# Patient Record
Sex: FEMALE | Race: WHITE | Employment: PART TIME | URBAN - METROPOLITAN AREA
[De-identification: names, ages, dates, MRNs, and addresses within clinical notes are randomized per-mention and may not be internally consistent; named-entity substitution may affect disease eponyms.]

---
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
## 2021-12-03 ENCOUNTER — HOSPITAL ENCOUNTER (EMERGENCY)
Age: 21
Discharge: HOME | End: 2021-12-04
Attending: EMERGENCY MEDICINE
Payer: COMMERCIAL

## 2021-12-03 VITALS
WEIGHT: 203.7 LBS | RESPIRATION RATE: 16 BRPM | TEMPERATURE: 97.9 F | HEIGHT: 61 IN | OXYGEN SATURATION: 100 % | SYSTOLIC BLOOD PRESSURE: 105 MMHG | BODY MASS INDEX: 38.46 KG/M2 | HEART RATE: 93 BPM | DIASTOLIC BLOOD PRESSURE: 60 MMHG

## 2021-12-03 DIAGNOSIS — Z20.822 CLOSE EXPOSURE TO COVID-19 VIRUS: Primary | ICD-10-CM

## 2021-12-03 PROCEDURE — U0003 INFECTIOUS AGENT DETECTION BY NUCLEIC ACID (DNA OR RNA); SEVERE ACUTE RESPIRATORY SYNDROME CORONAVIRUS 2 (SARS-COV-2) (CORONAVIRUS DISEASE [COVID-19]), AMPLIFIED PROBE TECHNIQUE, MAKING USE OF HIGH THROUGHPUT TECHNOLOGIES AS DESCRIBED BY CMS-2020-01-R: HCPCS

## 2021-12-03 PROCEDURE — U0005 INFEC AGEN DETEC AMPLI PROBE: HCPCS

## 2021-12-03 PROCEDURE — 99282 EMERGENCY DEPT VISIT SF MDM: CPT

## 2021-12-04 NOTE — ED PROVIDER NOTES
[facility] Emergency Department      Pt Name: Debi Valle  MRN: [id number]  [place] 2000  Date of evaluation: 12/3/2021  Provider: Kecia Llanos MD  CHIEF COMPLAINT  Chief Complaint   Patient presents with   Celamandavalarie Ray is a 24 y.o. female who presents because she was exposed to Covid with a close family member just being diagnosed with it. The patient is currently 29 weeks pregnant. She is asymptomatic. She wanted to have the test performed. She has already recovered from Covid infection once. She denies any chest pain or shortness of breath. Denies any abdominal pain. She has had good fetal movement. Denies any dysuria or frequency. REVIEW OF SYSTEMS:  No fever, no chest pain, no sob, no dysuria See HPI for further details. Remainder of pertinent ROS reviewed and negative. Nursing notes reviewed. PAST MEDICAL HISTORY  History reviewed. No pertinent past medical history. SURGICAL HISTORY  Past Surgical History:   Procedure Laterality Date    CHOLECYSTECTOMY       MEDICATIONS:  No current facility-administered medications on file prior to encounter. No current outpatient medications on file prior to encounter. ALLERGIES  Penicillins  SOCIAL HISTORY:  Social History     Tobacco Use    Smoking status: Never Smoker    Smokeless tobacco: Not on file   Vaping Use    Vaping Use: Never used   Substance Use Topics    Alcohol use: No    Drug use: No     IMMUNIZATIONS:    There is no immunization history on file for this patient. PHYSICAL EXAM  VITAL SIGNS:  Blood pressure 105/60, pulse 93, temperature 97.9 °F (36.6 °C), temperature source Oral, resp. rate 16, height 5' 1\" (1.549 m), weight 203 lb 11.2 oz (92.4 kg), last menstrual period 06/04/2021, SpO2 100 %.   Constitutional:  24 y.o. female who does not appear toxic or acutely ill  HENT:  Atraumatic, mucous membranes moist  Eyes:   Conjunctiva clear, no icterus  Neck:  Supple, no signs of injury, good

## 2021-12-04 NOTE — ED NOTES
Patient given d/c instructions with return verbalization . Will be called if Covid +. To check my chart for negative results if does not receive a call. Pt to calll OB/GYN with information. , To return with any worsening s/s. Patient ambulated to lobby with family.      Juan Jose Toney RN  12/04/21 [id number]

## 2021-12-05 LAB — SARS-COV-2: NOT DETECTED

## 2022-11-17 ENCOUNTER — OFFICE VISIT (OUTPATIENT)
Dept: ENT CLINIC | Age: 22
End: 2022-11-17
Payer: COMMERCIAL

## 2022-11-17 VITALS
HEIGHT: 61 IN | BODY MASS INDEX: 37.57 KG/M2 | HEART RATE: 80 BPM | SYSTOLIC BLOOD PRESSURE: 118 MMHG | WEIGHT: 199 LBS | DIASTOLIC BLOOD PRESSURE: 86 MMHG

## 2022-11-17 DIAGNOSIS — H92.03 OTALGIA OF BOTH EARS: Primary | ICD-10-CM

## 2022-11-17 DIAGNOSIS — H69.83 DYSFUNCTION OF BOTH EUSTACHIAN TUBES: ICD-10-CM

## 2022-11-17 DIAGNOSIS — M26.623 BILATERAL TEMPOROMANDIBULAR JOINT PAIN: ICD-10-CM

## 2022-11-17 PROCEDURE — G8417 CALC BMI ABV UP PARAM F/U: HCPCS | Performed by: OTOLARYNGOLOGY

## 2022-11-17 PROCEDURE — 99203 OFFICE O/P NEW LOW 30 MIN: CPT | Performed by: OTOLARYNGOLOGY

## 2022-11-17 PROCEDURE — 4004F PT TOBACCO SCREEN RCVD TLK: CPT | Performed by: OTOLARYNGOLOGY

## 2022-11-17 PROCEDURE — G8484 FLU IMMUNIZE NO ADMIN: HCPCS | Performed by: OTOLARYNGOLOGY

## 2022-11-17 PROCEDURE — G8427 DOCREV CUR MEDS BY ELIG CLIN: HCPCS | Performed by: OTOLARYNGOLOGY

## 2022-11-17 NOTE — PROGRESS NOTES
[facility]      Patient Name: [address] [place] [address] Record Number:  [id number]  Primary Care Physician:  Irma Sexton, BETITO - CNP  Date of Consultation: 11/17/2022    Chief Complaint: Ear problems        HISTORY OF 1923 [address] Junior Stauffer is a(n) 25 y.o. female who presents for ear issues. The patient says that she often times has a vague discomfort in her ears. It feels like a popping sensation. They oftentimes feel full. Sometimes it may affect her hearing. She has had this for a long time. She is never had ear surgery or ear tubes. She is tried certain medications and they have not helped. She does admit to grinding her teeth. She does not chew a lot of gum. She is working with a dentist on her teeth right now. REVIEW OF SYSTEMS  As above    PHYSICAL EXAM  GENERAL: No Acute Distress, Alert and Oriented, no Hoarseness, strong voice  EYES: EOMI, Anti-icteric  HENT:   Head: Normocephalic and atraumatic. Face:  Symmetric, facial nerve intact, no sinus tenderness  Right Ear: Normal external ear, normal external auditory canal, intact tympanic membrane with normal mobility and aerated middle ear  Left Ear: Normal external ear, normal external auditory canal, intact tympanic membrane with normal mobility and aerated middle ear  Mouth/Oral Cavity: Patient has mild discomfort in the bilateral TMJ region when opening closing mouth  Oropharynx/Larynx:  normal oropharynx  Nose:Normal external nasal appearance. NECK: Normal range of motion, no thyromegaly, trachea is midline, no lymphadenopathy, no neck masses, no crepitus            ASSESSMENT/PLAN  1. Otalgia of both ears  Not quite elizabeth otalgia, just a odd sensation. I think she has TMJ issues more than anything.   Is difficult to completely rule out eustachian tube dysfunction, but she does have a completely normal ear exam.  We are going to get a hearing test just because sometimes she feels as though she may have muffled hearing. 2. Bilateral temporomandibular joint pain  I told her to discuss a bite block with her dentist as she says that she thinks she grinds her teeth at night. 3. Dysfunction of both eustachian tubes  Start Flonase, obtain audiogram.  I am not sure that she even has actual eustachian tube dysfunction, but some of her description sounds like this. Her ear exam is pretty much normal             I have performed a head and neck physical exam personally or was physically present during the key or critical portions of the service. This note was generated completely or in part utilizing Dragon dictation speech recognition software. Occasionally, words are mistranscribed and despite editing, the text may contain inaccuracies due to incorrect word recognition. If further clarification is needed please contact the office at [phone].

## 2022-11-23 DIAGNOSIS — H91.90 HEARING LOSS, UNSPECIFIED HEARING LOSS TYPE, UNSPECIFIED LATERALITY: Primary | ICD-10-CM

## 2023-09-28 ENCOUNTER — HOSPITAL ENCOUNTER (EMERGENCY)
Age: 23
Discharge: HOME | End: 2023-09-28
Attending: EMERGENCY MEDICINE
Payer: COMMERCIAL

## 2023-09-28 VITALS
SYSTOLIC BLOOD PRESSURE: 123 MMHG | DIASTOLIC BLOOD PRESSURE: 77 MMHG | RESPIRATION RATE: 12 BRPM | HEART RATE: 68 BPM | TEMPERATURE: 98.3 F | OXYGEN SATURATION: 100 % | WEIGHT: 216.13 LBS | BODY MASS INDEX: 40.84 KG/M2

## 2023-09-28 DIAGNOSIS — J02.9 VIRAL PHARYNGITIS: Primary | ICD-10-CM

## 2023-09-28 LAB — HETEROPH AB BLD QL IA: NEGATIVE

## 2023-09-28 PROCEDURE — 86308 HETEROPHILE ANTIBODY SCREEN: CPT

## 2023-09-28 PROCEDURE — 99283 EMERGENCY DEPT VISIT LOW MDM: CPT

## 2023-09-28 NOTE — ED PROVIDER NOTES
[address] ENCOUNTER      Pt Name: Antwan Wright  MRN: [id number]  [address] [facility] 2000  Date of evaluation: 9/28/2023  Provider: Halley Shelton MD    [address]       Chief Complaint   Patient presents with    Pharyngitis     X 5 days seen at Urgent care, did strep test -, placed on atb's taken for 5 days , not getting better         HISTORY OF PRESENT ILLNESS   (Location/Symptom, Timing/Onset, Context/Setting, Quality, Duration, Modifying Factors, Severity)  Note limiting factors. Antwan Wright is a 21 y.o. female with past medical history of epic illness here for sore throat. Patient states that for almost 2 weeks she has had a itchy scratchy sometimes sore and painful throat. She is unsure of any overt fevers but is warm at times. No runny nose, nasal congestion. No cough. No vomiting or diarrhea. Denies cough. She has children at home but similar symptoms as of late. She notes 5 days ago she was in urgent care and had a strep swab performed that was negative, but was still placed on antibiotics which has been taking. She states she still has a sore throat and has come for reevaluation. No trouble swallowing. No change in her speech or voice    HPI    Nursing Notes were reviewed. REVIEW OF SYSTEMS    (2-9 systems for level 4, 10 or more for level 5)     Review of Systems    Please see HPI for pertinent positive and negative review of system findings. A full 10 system ROS was performed and otherwise negative. PAST MEDICAL HISTORY   History reviewed. No pertinent past medical history. SURGICAL HISTORY       Past Surgical History:   Procedure Laterality Date    CHOLECYSTECTOMY           CURRENT MEDICATIONS       Previous Medications    No medications on file       ALLERGIES     Penicillins    FAMILY HISTORY     History reviewed. No pertinent family history.        SOCIAL HISTORY       Social History     Socioeconomic History    Marital

## 2023-09-28 NOTE — ED NOTES
Pt dc/d with instructions in stable condition, ambulatory to lobby. Home per ride.      Reji Lux RN  09/28/23 0872

## 2023-10-12 ENCOUNTER — OFFICE VISIT (OUTPATIENT)
Dept: ENT CLINIC | Age: 23
End: 2023-10-12
Payer: COMMERCIAL

## 2023-10-12 VITALS
SYSTOLIC BLOOD PRESSURE: 132 MMHG | HEART RATE: 76 BPM | HEIGHT: 61 IN | BODY MASS INDEX: 41.54 KG/M2 | DIASTOLIC BLOOD PRESSURE: 83 MMHG | WEIGHT: 220 LBS | TEMPERATURE: 99 F | RESPIRATION RATE: 16 BRPM | OXYGEN SATURATION: 98 %

## 2023-10-12 DIAGNOSIS — J02.9 PHARYNGITIS, UNSPECIFIED ETIOLOGY: Primary | ICD-10-CM

## 2023-10-12 PROCEDURE — G8484 FLU IMMUNIZE NO ADMIN: HCPCS | Performed by: OTOLARYNGOLOGY

## 2023-10-12 PROCEDURE — 1036F TOBACCO NON-USER: CPT | Performed by: OTOLARYNGOLOGY

## 2023-10-12 PROCEDURE — G8417 CALC BMI ABV UP PARAM F/U: HCPCS | Performed by: OTOLARYNGOLOGY

## 2023-10-12 PROCEDURE — G8427 DOCREV CUR MEDS BY ELIG CLIN: HCPCS | Performed by: OTOLARYNGOLOGY

## 2023-10-12 PROCEDURE — 99213 OFFICE O/P EST LOW 20 MIN: CPT | Performed by: OTOLARYNGOLOGY

## 2023-10-12 RX ORDER — PREDNISONE 20 MG/1
20 TABLET ORAL DAILY
Qty: 5 TABLET | Refills: 0 | Status: SHIPPED | OUTPATIENT
Start: 2023-10-12 | End: 2023-10-17

## 2023-10-12 NOTE — PROGRESS NOTES
[address] [facility] NECK SURGERY  Follow up      Patient Name: Arlei [facility] Record Number:  [id number]  Primary Care Physician:  BETITO Almonte CNP  Date of Consultation: 10/12/2023    Chief Complaint: Sore throat        Interval History    Patient is presenting today for evaluation of a sore throat. She says she had a sore throat for about the past month. She has been seen by her her family doctors as well as the ER. She has had antibiotics. He said that the sore throat persists. She was tested for mono. It was negative. She is unaware of ever having mono in the past.  She does not get recurrent tonsillitis. REVIEW OF SYSTEMS    As above  PHYSICAL EXAM  GENERAL: No Acute Distress, Alert and Oriented, no Hoarseness, strong voice  EYES: EOMI, Anti-icteric  HENT:   Head: Normocephalic and atraumatic. Face:  Symmetric, facial nerve intact, no sinus tenderness  Right Ear: Normal external ear, normal external auditory canal, intact tympanic membrane with normal mobility and aerated middle ear  Left Ear: Normal external ear, normal external auditory canal, intact tympanic membrane with normal mobility and aerated middle ear  Mouth/Oral Cavity:  normal lips, Uvula is midline, no mucosal lesions  Oropharynx/Larynx: Mild to moderately inflamed tonsils without evidence of an acute infection  Nose:Normal external nasal appearance. NECK: Normal range of motion, no thyromegaly, trachea is midline, no lymphadenopathy, no neck masses, no crepitus            ASSESSMENT/PLAN  1. Pharyngitis, unspecified etiology  She does have some inflamed tonsils. She does not appear to have an acute infection. I suspect this is probably a viral etiology. it has only been a month so I would not recommend a tonsillectomy until she has symptoms for at least 3 months. I would give her a short course of prednisone to see if it helps. Follow-up in 6 to 8 weeks.              I have

## 2023-12-28 ENCOUNTER — APPOINTMENT (OUTPATIENT)
Dept: CT IMAGING | Age: 23
End: 2023-12-28
Payer: COMMERCIAL

## 2023-12-28 ENCOUNTER — HOSPITAL ENCOUNTER (EMERGENCY)
Age: 23
Discharge: HOME | End: 2023-12-28
Attending: STUDENT IN AN ORGANIZED HEALTH CARE EDUCATION/TRAINING PROGRAM
Payer: COMMERCIAL

## 2023-12-28 VITALS
RESPIRATION RATE: 18 BRPM | SYSTOLIC BLOOD PRESSURE: 127 MMHG | TEMPERATURE: 98.6 F | BODY MASS INDEX: 39.71 KG/M2 | HEART RATE: 91 BPM | OXYGEN SATURATION: 99 % | HEIGHT: 62 IN | WEIGHT: 215.8 LBS | DIASTOLIC BLOOD PRESSURE: 69 MMHG

## 2023-12-28 DIAGNOSIS — N30.01 ACUTE CYSTITIS WITH HEMATURIA: ICD-10-CM

## 2023-12-28 DIAGNOSIS — R11.2 NAUSEA AND VOMITING, UNSPECIFIED VOMITING TYPE: Primary | ICD-10-CM

## 2023-12-28 LAB
ALBUMIN SERPL-MCNC: 4.8 G/DL (ref 3.4–5)
ALBUMIN/GLOB SERPL: 1.5 {RATIO} (ref 1.1–2.2)
ALP SERPL-CCNC: 79 U/L (ref 40–129)
ALT SERPL-CCNC: 25 U/L (ref 10–40)
AMORPH SED URNS QL MICRO: ABNORMAL /HPF
ANION GAP SERPL CALCULATED.3IONS-SCNC: 12 MMOL/L (ref 3–16)
AST SERPL-CCNC: 22 U/L (ref 15–37)
BACTERIA URNS QL MICRO: ABNORMAL /HPF
BASOPHILS # BLD: 0.2 K/UL (ref 0–0.2)
BASOPHILS NFR BLD: 2 %
BILIRUB SERPL-MCNC: 0.8 MG/DL (ref 0–1)
BILIRUB UR QL STRIP.AUTO: ABNORMAL
BUN SERPL-MCNC: 9 MG/DL (ref 7–20)
CALCIUM SERPL-MCNC: 9.5 MG/DL (ref 8.3–10.6)
CHLORIDE SERPL-SCNC: 106 MMOL/L (ref 99–110)
CLARITY UR: ABNORMAL
CO2 SERPL-SCNC: 21 MMOL/L (ref 21–32)
COLOR UR: YELLOW
CREAT SERPL-MCNC: <0.5 MG/DL (ref 0.6–1.1)
DEPRECATED RDW RBC AUTO: 13.3 % (ref 12.4–15.4)
EOSINOPHIL # BLD: 0.1 K/UL (ref 0–0.6)
EOSINOPHIL NFR BLD: 0.5 %
EPI CELLS #/AREA URNS HPF: ABNORMAL /HPF (ref 0–5)
GFR SERPLBLD CREATININE-BSD FMLA CKD-EPI: >60 ML/MIN/{1.73_M2}
GLUCOSE SERPL-MCNC: 92 MG/DL (ref 70–99)
GLUCOSE UR STRIP.AUTO-MCNC: NEGATIVE MG/DL
HCG UR QL: NEGATIVE
HCT VFR BLD AUTO: 42.2 % (ref 36–48)
HGB BLD-MCNC: 14.4 G/DL (ref 12–16)
HGB UR QL STRIP.AUTO: ABNORMAL
KETONES UR STRIP.AUTO-MCNC: ABNORMAL MG/DL
LEUKOCYTE ESTERASE UR QL STRIP.AUTO: NEGATIVE
LIPASE SERPL-CCNC: 17 U/L (ref 13–60)
LYMPHOCYTES # BLD: 0.7 K/UL (ref 1–5.1)
LYMPHOCYTES NFR BLD: 7 %
MAGNESIUM SERPL-MCNC: 2.2 MG/DL (ref 1.8–2.4)
MCH RBC QN AUTO: 31.8 PG (ref 26–34)
MCHC RBC AUTO-ENTMCNC: 34.1 G/DL (ref 31–36)
MCV RBC AUTO: 93.4 FL (ref 80–100)
MONOCYTES # BLD: 0.5 K/UL (ref 0–1.3)
MONOCYTES NFR BLD: 5.2 %
NEUTROPHILS # BLD: 8 K/UL (ref 1.7–7.7)
NEUTROPHILS NFR BLD: 85.3 %
NITRITE UR QL STRIP.AUTO: NEGATIVE
PH UR STRIP.AUTO: 5.5 [PH] (ref 5–8)
PLATELET # BLD AUTO: 288 K/UL (ref 135–450)
PMV BLD AUTO: 8 FL (ref 5–10.5)
POTASSIUM SERPL-SCNC: 3.5 MMOL/L (ref 3.5–5.1)
PROT SERPL-MCNC: 8.1 G/DL (ref 6.4–8.2)
PROT UR STRIP.AUTO-MCNC: NEGATIVE MG/DL
RBC # BLD AUTO: 4.51 M/UL (ref 4–5.2)
RBC #/AREA URNS HPF: ABNORMAL /HPF (ref 0–4)
SODIUM SERPL-SCNC: 139 MMOL/L (ref 136–145)
SP GR UR STRIP.AUTO: >=1.03 (ref 1–1.03)
UA COMPLETE W REFLEX CULTURE PNL UR: ABNORMAL
UA DIPSTICK W REFLEX MICRO PNL UR: YES
URN SPEC COLLECT METH UR: ABNORMAL
UROBILINOGEN UR STRIP-ACNC: 0.2 E.U./DL
WBC # BLD AUTO: 9.4 K/UL (ref 4–11)
WBC #/AREA URNS HPF: ABNORMAL /HPF (ref 0–5)

## 2023-12-28 PROCEDURE — 96375 TX/PRO/DX INJ NEW DRUG ADDON: CPT

## 2023-12-28 PROCEDURE — 83690 ASSAY OF LIPASE: CPT

## 2023-12-28 PROCEDURE — 85025 COMPLETE CBC W/AUTO DIFF WBC: CPT

## 2023-12-28 PROCEDURE — 84703 CHORIONIC GONADOTROPIN ASSAY: CPT

## 2023-12-28 PROCEDURE — 80053 COMPREHEN METABOLIC PANEL: CPT

## 2023-12-28 PROCEDURE — 96374 THER/PROPH/DIAG INJ IV PUSH: CPT

## 2023-12-28 PROCEDURE — 2500000003 HC RX 250 WO HCPCS: Performed by: STUDENT IN AN ORGANIZED HEALTH CARE EDUCATION/TRAINING PROGRAM

## 2023-12-28 PROCEDURE — 2580000003 HC RX 258: Performed by: STUDENT IN AN ORGANIZED HEALTH CARE EDUCATION/TRAINING PROGRAM

## 2023-12-28 PROCEDURE — 74177 CT ABD & PELVIS W/CONTRAST: CPT

## 2023-12-28 PROCEDURE — 6360000004 HC RX CONTRAST MEDICATION: Performed by: STUDENT IN AN ORGANIZED HEALTH CARE EDUCATION/TRAINING PROGRAM

## 2023-12-28 PROCEDURE — 83735 ASSAY OF MAGNESIUM: CPT

## 2023-12-28 PROCEDURE — A4216 STERILE WATER/SALINE, 10 ML: HCPCS | Performed by: STUDENT IN AN ORGANIZED HEALTH CARE EDUCATION/TRAINING PROGRAM

## 2023-12-28 PROCEDURE — 96361 HYDRATE IV INFUSION ADD-ON: CPT

## 2023-12-28 PROCEDURE — 99285 EMERGENCY DEPT VISIT HI MDM: CPT

## 2023-12-28 PROCEDURE — 81001 URINALYSIS AUTO W/SCOPE: CPT

## 2023-12-28 PROCEDURE — 6360000002 HC RX W HCPCS: Performed by: STUDENT IN AN ORGANIZED HEALTH CARE EDUCATION/TRAINING PROGRAM

## 2023-12-28 RX ORDER — ONDANSETRON 4 MG/1
4 TABLET, ORALLY DISINTEGRATING ORAL 3 TIMES DAILY PRN
Qty: 21 TABLET | Refills: 0 | Status: SHIPPED | OUTPATIENT
Start: 2023-12-28

## 2023-12-28 RX ORDER — PROMETHAZINE HYDROCHLORIDE 25 MG/1
25 TABLET ORAL EVERY 6 HOURS PRN
Qty: 20 TABLET | Refills: 0 | Status: SHIPPED | OUTPATIENT
Start: 2023-12-28 | End: 2024-01-04

## 2023-12-28 RX ORDER — SULFAMETHOXAZOLE AND TRIMETHOPRIM 800; 160 MG/1; MG/1
1 TABLET ORAL 2 TIMES DAILY
Qty: 14 TABLET | Refills: 0 | Status: SHIPPED | OUTPATIENT
Start: 2023-12-28 | End: 2024-01-04

## 2023-12-28 RX ORDER — ONDANSETRON 2 MG/ML
4 INJECTION INTRAMUSCULAR; INTRAVENOUS ONCE
Status: COMPLETED | OUTPATIENT
Start: 2023-12-28 | End: 2023-12-28

## 2023-12-28 RX ORDER — 0.9 % SODIUM CHLORIDE 0.9 %
1000 INTRAVENOUS SOLUTION INTRAVENOUS ONCE
Status: COMPLETED | OUTPATIENT
Start: 2023-12-28 | End: 2023-12-28

## 2023-12-28 RX ADMIN — SODIUM CHLORIDE 1000 ML: 0.9 INJECTION, SOLUTION INTRAVENOUS at 14:53

## 2023-12-28 RX ADMIN — IOPAMIDOL 75 ML: 755 INJECTION, SOLUTION INTRAVENOUS at 15:15

## 2023-12-28 RX ADMIN — FAMOTIDINE 20 MG: 10 INJECTION, SOLUTION INTRAVENOUS at 14:57

## 2023-12-28 RX ADMIN — ONDANSETRON 4 MG: 2 INJECTION INTRAMUSCULAR; INTRAVENOUS at 14:58

## 2023-12-28 NOTE — ED PROVIDER NOTES
provider for clarification.      Moiz Bhatia MD  [address] [place] [address]        Moiz Bhatia MD  12/28/23 0063

## 2023-12-28 NOTE — ED TRIAGE NOTES
Patient c/o abd with n/v for the past several days Pt has urine with large sediment, denies dysuria.

## 2023-12-28 NOTE — ED NOTES
Patient given d/c instructions with return verbalization including Rx. Emphasis on f/u, to return with worsening s/s Pt does not need work note. No family with patient. Patient ambulated to lobby with steady gait.

## 2024-02-06 ENCOUNTER — HOSPITAL ENCOUNTER (EMERGENCY)
Age: 24
Discharge: HOME | End: 2024-02-06
Attending: EMERGENCY MEDICINE
Payer: COMMERCIAL

## 2024-02-06 VITALS
HEART RATE: 67 BPM | BODY MASS INDEX: 38.59 KG/M2 | DIASTOLIC BLOOD PRESSURE: 87 MMHG | WEIGHT: 211 LBS | RESPIRATION RATE: 18 BRPM | SYSTOLIC BLOOD PRESSURE: 148 MMHG | TEMPERATURE: 98.1 F | OXYGEN SATURATION: 100 %

## 2024-02-06 VITALS
WEIGHT: 216 LBS | SYSTOLIC BLOOD PRESSURE: 140 MMHG | TEMPERATURE: 98.2 F | BODY MASS INDEX: 39.51 KG/M2 | HEART RATE: 82 BPM | OXYGEN SATURATION: 100 % | DIASTOLIC BLOOD PRESSURE: 70 MMHG | RESPIRATION RATE: 18 BRPM

## 2024-02-06 DIAGNOSIS — K08.89 PAIN, DENTAL: Primary | ICD-10-CM

## 2024-02-06 DIAGNOSIS — Z98.818 STATUS POST WISDOM TOOTH EXTRACTION: Primary | ICD-10-CM

## 2024-02-06 DIAGNOSIS — K08.89 PAIN, DENTAL: ICD-10-CM

## 2024-02-06 PROCEDURE — 99284 EMERGENCY DEPT VISIT MOD MDM: CPT

## 2024-02-06 PROCEDURE — 96372 THER/PROPH/DIAG INJ SC/IM: CPT

## 2024-02-06 PROCEDURE — 99283 EMERGENCY DEPT VISIT LOW MDM: CPT

## 2024-02-06 PROCEDURE — 6360000002 HC RX W HCPCS: Performed by: EMERGENCY MEDICINE

## 2024-02-06 PROCEDURE — 6370000000 HC RX 637 (ALT 250 FOR IP): Performed by: EMERGENCY MEDICINE

## 2024-02-06 RX ORDER — OXYCODONE HYDROCHLORIDE AND ACETAMINOPHEN 5; 325 MG/1; MG/1
1-2 TABLET ORAL EVERY 6 HOURS PRN
Qty: 20 TABLET | Refills: 0 | Status: SHIPPED | OUTPATIENT
Start: 2024-02-06 | End: 2024-02-13

## 2024-02-06 RX ORDER — OXYCODONE HYDROCHLORIDE AND ACETAMINOPHEN 5; 325 MG/1; MG/1
1 TABLET ORAL ONCE
Status: COMPLETED | OUTPATIENT
Start: 2024-02-06 | End: 2024-02-06

## 2024-02-06 RX ORDER — KETOROLAC TROMETHAMINE 30 MG/ML
30 INJECTION, SOLUTION INTRAMUSCULAR; INTRAVENOUS ONCE
Status: COMPLETED | OUTPATIENT
Start: 2024-02-06 | End: 2024-02-06

## 2024-02-06 RX ORDER — DOXYCYCLINE 100 MG/1
100 CAPSULE ORAL ONCE
Status: COMPLETED | OUTPATIENT
Start: 2024-02-06 | End: 2024-02-06

## 2024-02-06 RX ORDER — TRAMADOL HYDROCHLORIDE 50 MG/1
50 TABLET ORAL EVERY 6 HOURS PRN
Qty: 8 TABLET | Refills: 0 | Status: SHIPPED | OUTPATIENT
Start: 2024-02-06 | End: 2024-02-08

## 2024-02-06 RX ORDER — DOXYCYCLINE HYCLATE 100 MG
100 TABLET ORAL 2 TIMES DAILY
Qty: 14 TABLET | Refills: 0 | Status: SHIPPED | OUTPATIENT
Start: 2024-02-06 | End: 2024-02-13

## 2024-02-06 RX ADMIN — KETOROLAC TROMETHAMINE 30 MG: 30 INJECTION, SOLUTION INTRAMUSCULAR; INTRAVENOUS at 05:14

## 2024-02-06 RX ADMIN — OXYCODONE HYDROCHLORIDE AND ACETAMINOPHEN 1 TABLET: 5; 325 TABLET ORAL at 22:19

## 2024-02-06 RX ADMIN — DOXYCYCLINE 100 MG: 100 CAPSULE ORAL at 22:19

## 2024-02-06 ASSESSMENT — PAIN DESCRIPTION - DESCRIPTORS: DESCRIPTORS: ACHING;SORE

## 2024-02-06 ASSESSMENT — PAIN DESCRIPTION - PAIN TYPE
TYPE: ACUTE PAIN
TYPE: ACUTE PAIN

## 2024-02-06 ASSESSMENT — PAIN DESCRIPTION - LOCATION: LOCATION: TEETH;MOUTH

## 2024-02-06 ASSESSMENT — PAIN SCALES - GENERAL
PAINLEVEL_OUTOF10: 10
PAINLEVEL_OUTOF10: 4

## 2024-02-06 ASSESSMENT — PAIN DESCRIPTION - FREQUENCY
FREQUENCY: CONTINUOUS
FREQUENCY: CONTINUOUS

## 2024-02-06 ASSESSMENT — PAIN - FUNCTIONAL ASSESSMENT
PAIN_FUNCTIONAL_ASSESSMENT: 0-10
PAIN_FUNCTIONAL_ASSESSMENT: 0-10

## 2024-02-06 ASSESSMENT — PAIN DESCRIPTION - ORIENTATION: ORIENTATION: LEFT;RIGHT

## 2024-02-06 NOTE — DISCHARGE INSTRUCTIONS
Continue ibuprofen (motrin) every 6 hours. Use tramadol for severe pain. Ice to jaw. Soft diet. Return for fever, facial swelling, drainage from extraction site, trouble swallowing or breathing.

## 2024-02-06 NOTE — ED PROVIDER NOTES
of airway compromise.  No clinical evidence of Ludwigs.  I did not feel that blood work or CT imaging were necessary at this time.  Patient will be given a very short supply of tramadol and was advised to try once again to reach oral maxillofacial surgery tomorrow even if that means going into the clinic.     Clinical Impression:  1. Status post wisdom tooth extraction    2. Pain, dental        Dispo:  Patient will be discharged  at this time. Patient was informed of this decision and agrees with plan. I have discussed lab and xray findings with patient and they understand. Questions were answered to the best of my ability.    Followup:  [facility] at [facility]  [address], [address]   [place] [address] [phone]  Call today        Discharge vitals:  Blood pressure (!) 140/70, pulse 82, temperature 98.2 °F (36.8 °C), temperature source Oral, resp. rate 18, weight 98 kg (216 lb), SpO2 100 %, unknown if currently breastfeeding.    Prescriptions given:   New Prescriptions    TRAMADOL (ULTRAM) 50 MG TABLET    Take 1 tablet by mouth every 6 hours as needed for Pain for up to 2 days. I Take lowest dose possible to manage pain Max Daily Amount: 200 mg         This chart was created using dragon voice recognition software.        Carmel Curran MD  02/06/24 [id number]

## 2024-02-07 NOTE — ED NOTES
Pt discharged to home. 2 prescriptions given. Discharge paperwork discussed. All questions and concerns answered at this time. Pt awake and alert. Respirations even and unlabored.

## 2024-02-07 NOTE — ED PROVIDER NOTES
[facility] EMERGENCY DEPARTMENT  eMERGENCY dEPARTMENT eNCOUnter      Pt Name: Tika Cotto  MRN: [id number]  Birthdate 2000  Date of evaluation: 2/6/2024  Provider: Israel Segura MD  PCP: Rimma Brandt, APRN - CNP      CHIEF COMPLAINT       Chief Complaint   Patient presents with    Dental Pain       HISTORY OFPRESENT ILLNESS   (Location/Symptom, Timing/Onset, Context/Setting, Quality, Duration, Modifying Factors,Severity)  Note limiting factors.     Tika Cotto is a 23 y.o. female presents with complaints of tooth pain says she was seen here earlier in the day was given a shot but not given any antibiotic she is not currently on any antibiotics she had her wisdom teeth removed and she is having severe pain in her mouth    Nursing Notes were all reviewed and agreed with or any disagreements were addressed  in the HPI.    REVIEW OF SYSTEMS    (2-9 systems for level 4, 10 or more for level 5)     Review of Systems    Positives and Pertinent negatives as per HPI.  Except as noted above in the ROS, all other systems were reviewed andnegative.       PASTMEDICAL HISTORY   History reviewed. No pertinent past medical history.      SURGICAL HISTORY       Past Surgical History:   Procedure Laterality Date    CHOLECYSTECTOMY           CURRENT MEDICATIONS       Previous Medications    ONDANSETRON (ZOFRAN-ODT) 4 MG DISINTEGRATING TABLET    Take 1 tablet by mouth 3 times daily as needed for Nausea or Vomiting    TRAMADOL (ULTRAM) 50 MG TABLET    Take 1 tablet by mouth every 6 hours as needed for Pain for up to 2 days. I Take lowest dose possible to manage pain Max Daily Amount: 200 mg       ALLERGIES     Red dye, Adhesive tape, and Penicillins    FAMILY HISTORY     History reviewed. No pertinent family history.       SOCIAL HISTORY       Social History     Socioeconomic History    Marital status: Single     Spouse name: None    Number of children: None    Years of education: None    Highest education

## 2024-02-07 NOTE — DISCHARGE INSTRUCTIONS
Discharge home  Motrin over-the-counter for pain  Percocet for breakthrough pain  Doxycycline  Follow-up with your oral surgeon

## 2024-05-01 NOTE — PROGRESS NOTES
CARDIOLOGY CONSULTATION        Patient Name: Tika Cotto  Primary Care physician: Rimma Brandt APRN - CNP    Reason for Referral/Chief Complaint: Tika Cotto is a 23 y.o. patient who is referred to cardiology clinic today for evaluation and treatment of tachycardia.     History of Present Illness:   Tika Cotto is a 23 y.o. female who has not past medical history noted in chart. She had an episode of syncope which prompted a Holter monitor in 2021.   Previous testing- Holter monitor placed for syncope on 4/2021 for 24 hours- average heart rate 84 (), NSR, symptoms with NSR.     She recently underwent extraction of third molars in 2/2024, and presented to the ER afterwards with pain at .     Today, 5/2/2024,     The patient denies chest pain, shortness of breath at rest and dyspnea with exertion. Denies palpitations, dizziness, near-syncope or elizabeth syncope. Denies paroxysmal nocturnal dyspnea, orthopnea, bendopnea, increasing lower extremity edema or weight gain.  The patient endorses highest level of activity as ***.     Past Medical History:   has no past medical history on file.    Surgical History:   has a past surgical history that includes Cholecystectomy.     Social History:   reports that she has never smoked. She does not have any smokeless tobacco history on file. She reports that she does not drink alcohol and does not use drugs.     Family History:  family history is not on file.    Home Medications:  Were reviewed and are listed in nursing record and/or below  Prior to Admission medications    Medication Sig Start Date End Date Taking? Authorizing Provider   ondansetron (ZOFRAN-ODT) 4 MG disintegrating tablet Take 1 tablet by mouth 3 times daily as needed for Nausea or Vomiting 12/28/23   Libby Medina MD        CURRENT Medications:  No current facility-administered medications for this visit.      Allergies:  Red dye, Adhesive tape, and Penicillins     Review of Systems:   A

## 2024-05-02 ENCOUNTER — OFFICE VISIT (OUTPATIENT)
Dept: CARDIOLOGY CLINIC | Age: 24
End: 2024-05-02

## 2024-05-02 VITALS
BODY MASS INDEX: 38.83 KG/M2 | WEIGHT: 211 LBS | HEART RATE: 91 BPM | OXYGEN SATURATION: 98 % | DIASTOLIC BLOOD PRESSURE: 84 MMHG | HEIGHT: 62 IN | SYSTOLIC BLOOD PRESSURE: 105 MMHG

## 2024-05-02 DIAGNOSIS — R55 NEAR SYNCOPE: ICD-10-CM

## 2024-05-02 DIAGNOSIS — R42 DIZZINESS: Primary | ICD-10-CM

## 2024-05-02 DIAGNOSIS — E66.9 CLASS II OBESITY: ICD-10-CM

## 2024-05-02 DIAGNOSIS — Z76.89 ESTABLISHING CARE WITH NEW DOCTOR, ENCOUNTER FOR: ICD-10-CM

## 2024-05-02 DIAGNOSIS — Z79.899 MEDICATION MANAGEMENT: ICD-10-CM

## 2024-05-02 PROBLEM — E66.812 CLASS II OBESITY: Status: ACTIVE | Noted: 2024-05-02

## 2024-05-02 RX ORDER — LAMOTRIGINE 25 MG/1
TABLET ORAL
COMMUNITY
Start: 2024-04-24

## 2024-05-02 NOTE — PATIENT INSTRUCTIONS
Plan:  Order Echocardiogram to view size and strength of the heart   Referral to electrophysiologist for possible tilt table testing to exclude POTS.   Recommend staying hydrated, and drinking electrolytes. Also encouraged having a salty snack on hand for dizziness spells.   Fasting Labs: TSH, A1C, Lipids, CBC, CMP  Follow up with me in 3 months.     Your provider has ordered testing for further evaluation.  An order/prescription has been included in your paper work.   To schedule outpatient testing, contact Central Scheduling by calling [facility] ([phone]).

## 2024-05-02 NOTE — PROGRESS NOTES
CARDIOLOGY CONSULTATION        Patient Name: Tika Cotto  Primary Care physician: Rimma Brandt APRN - CNP    Reason for Referral/Chief Complaint: Tika Cotto is a 23 y.o. patient who is referred to cardiology clinic today for evaluation and treatment of tachycardia.     History of Present Illness:   Tika Cotto is a 23 y.o. female who has not past medical history noted in chart. She had an episode of syncope which prompted a Holter monitor in 2021.   Previous testing- Holter monitor placed for syncope on 4/2021 for 24 hours- average heart rate 84 (), NSR, symptoms with NSR.     She recently underwent extraction of third molars in 2/2024, and presented to the ER afterwards with pain at .     Today, 5/2/2024,  she states she is doing well, however, she had 2 syncopal episodes in 2018, and 2021 and she continues to have significant dizziness with position changes. She states she has had several near syncopal episodes, but she is able to recover with rest. She states she has had occasional stabbing chest pain that radiates to her L arm at times, but denies chest pain and palpitations. She states she drinks 1 gallon of water daily and she denies tobacco use. States she uses mariajuana occasionally, but it has been over 1 year since last use. She denies alcohol use, and her family history is significant for several family members with hypertension and CAD with PCI and stents placed.     Past Medical History:   has no past medical history on file.    Surgical History:   has a past surgical history that includes Cholecystectomy.     Social History:   reports that she has never smoked. She does not have any smokeless tobacco history on file. She reports that she does not drink alcohol and does not use drugs.     Family History:  family history is not on file.    Home Medications:  Were reviewed and are listed in nursing record and/or below  Prior to Admission medications    Medication Sig Start Date End

## 2024-05-23 ENCOUNTER — HOSPITAL ENCOUNTER (OUTPATIENT)
Age: 24
Discharge: HOME | End: 2024-05-23
Payer: COMMERCIAL

## 2024-05-23 DIAGNOSIS — Z79.899 MEDICATION MANAGEMENT: ICD-10-CM

## 2024-05-23 LAB
ALBUMIN SERPL-MCNC: 4.6 G/DL (ref 3.4–5)
ALBUMIN/GLOB SERPL: 1.5 {RATIO} (ref 1.1–2.2)
ALP SERPL-CCNC: 79 U/L (ref 40–129)
ALT SERPL-CCNC: 25 U/L (ref 10–40)
ANION GAP SERPL CALCULATED.3IONS-SCNC: 11 MMOL/L (ref 3–16)
AST SERPL-CCNC: 23 U/L (ref 15–37)
BASOPHILS # BLD: 0.1 K/UL (ref 0–0.2)
BASOPHILS NFR BLD: 1.3 %
BILIRUB SERPL-MCNC: 0.5 MG/DL (ref 0–1)
BUN SERPL-MCNC: 9 MG/DL (ref 7–20)
CALCIUM SERPL-MCNC: 9.3 MG/DL (ref 8.3–10.6)
CHLORIDE SERPL-SCNC: 104 MMOL/L (ref 99–110)
CHOLEST SERPL-MCNC: 191 MG/DL (ref 0–199)
CO2 SERPL-SCNC: 24 MMOL/L (ref 21–32)
CREAT SERPL-MCNC: 0.6 MG/DL (ref 0.6–1.1)
DEPRECATED RDW RBC AUTO: 13.6 % (ref 12.4–15.4)
EOSINOPHIL # BLD: 0.2 K/UL (ref 0–0.6)
EOSINOPHIL NFR BLD: 2.6 %
GFR SERPLBLD CREATININE-BSD FMLA CKD-EPI: >90 ML/MIN/{1.73_M2}
GLUCOSE SERPL-MCNC: 66 MG/DL (ref 70–99)
HCT VFR BLD AUTO: 39.6 % (ref 36–48)
HDLC SERPL-MCNC: 61 MG/DL (ref 40–60)
HGB BLD-MCNC: 13.5 G/DL (ref 12–16)
LDLC SERPL CALC-MCNC: 108 MG/DL
LYMPHOCYTES # BLD: 2.4 K/UL (ref 1–5.1)
LYMPHOCYTES NFR BLD: 26.8 %
MCH RBC QN AUTO: 33.1 PG (ref 26–34)
MCHC RBC AUTO-ENTMCNC: 34.1 G/DL (ref 31–36)
MCV RBC AUTO: 97 FL (ref 80–100)
MONOCYTES # BLD: 0.6 K/UL (ref 0–1.3)
MONOCYTES NFR BLD: 6.4 %
NEUTROPHILS # BLD: 5.7 K/UL (ref 1.7–7.7)
NEUTROPHILS NFR BLD: 62.9 %
PLATELET # BLD AUTO: 377 K/UL (ref 135–450)
PMV BLD AUTO: 8.5 FL (ref 5–10.5)
POTASSIUM SERPL-SCNC: 4.5 MMOL/L (ref 3.5–5.1)
PROT SERPL-MCNC: 7.7 G/DL (ref 6.4–8.2)
RBC # BLD AUTO: 4.09 M/UL (ref 4–5.2)
SODIUM SERPL-SCNC: 139 MMOL/L (ref 136–145)
TRIGL SERPL-MCNC: 112 MG/DL (ref 0–150)
TSH SERPL DL<=0.005 MIU/L-ACNC: 1.23 UIU/ML (ref 0.27–4.2)
VLDLC SERPL CALC-MCNC: 22 MG/DL
WBC # BLD AUTO: 9 K/UL (ref 4–11)

## 2024-05-23 PROCEDURE — 84443 ASSAY THYROID STIM HORMONE: CPT

## 2024-05-23 PROCEDURE — 36415 COLL VENOUS BLD VENIPUNCTURE: CPT

## 2024-05-23 PROCEDURE — 80053 COMPREHEN METABOLIC PANEL: CPT

## 2024-05-23 PROCEDURE — 83036 HEMOGLOBIN GLYCOSYLATED A1C: CPT

## 2024-05-23 PROCEDURE — 80061 LIPID PANEL: CPT

## 2024-05-23 PROCEDURE — 85025 COMPLETE CBC W/AUTO DIFF WBC: CPT

## 2024-05-24 ENCOUNTER — TELEPHONE (OUTPATIENT)
Dept: CARDIOLOGY CLINIC | Age: 24
End: 2024-05-24

## 2024-05-24 LAB
EST. AVERAGE GLUCOSE BLD GHB EST-MCNC: 82.5 MG/DL
HBA1C MFR BLD: 4.5 %

## 2024-05-24 NOTE — TELEPHONE ENCOUNTER
----- Message from Candido Chavez MD sent at 5/24/2024 11:12 AM EDT -----  Please inform patient her labs are at goal.  ----- Message -----  From: Maddie Incoming Lab Results From [facility] (Epic Adt)  Sent: 5/23/2024   6:37 PM EDT  To: Candido Chavez MD

## 2024-06-20 ENCOUNTER — TELEPHONE (OUTPATIENT)
Dept: CARDIOLOGY CLINIC | Age: 24
End: 2024-06-20

## 2024-06-20 ENCOUNTER — HOSPITAL ENCOUNTER (OUTPATIENT)
Dept: CARDIOLOGY | Age: 24
Discharge: HOME | End: 2024-06-22
Attending: INTERNAL MEDICINE
Payer: COMMERCIAL

## 2024-06-20 VITALS
BODY MASS INDEX: 38.83 KG/M2 | DIASTOLIC BLOOD PRESSURE: 84 MMHG | WEIGHT: 211 LBS | SYSTOLIC BLOOD PRESSURE: 105 MMHG | HEIGHT: 62 IN

## 2024-06-20 DIAGNOSIS — R55 NEAR SYNCOPE: Primary | ICD-10-CM

## 2024-06-20 DIAGNOSIS — R93.1 ABNORMAL ECHOCARDIOGRAM: ICD-10-CM

## 2024-06-20 DIAGNOSIS — R42 DIZZINESS: ICD-10-CM

## 2024-06-20 LAB
ECHO AO ASC DIAM: 2.2 CM
ECHO AO ASCENDING AORTA INDEX: 1.12 CM/M2
ECHO AO ROOT DIAM: 2.6 CM
ECHO AO ROOT INDEX: 1.33 CM/M2
ECHO AV AREA PEAK VELOCITY: 2.7 CM2
ECHO AV AREA VTI: 2.6 CM2
ECHO AV AREA/BSA PEAK VELOCITY: 1.4 CM2/M2
ECHO AV AREA/BSA VTI: 1.3 CM2/M2
ECHO AV MEAN GRADIENT: 3 MMHG
ECHO AV MEAN VELOCITY: 0.8 M/S
ECHO AV PEAK GRADIENT: 5 MMHG
ECHO AV PEAK VELOCITY: 1.2 M/S
ECHO AV VELOCITY RATIO: 0.83
ECHO AV VTI: 24.9 CM
ECHO BSA: 2.05 M2
ECHO EST RA PRESSURE: 8 MMHG
ECHO LA AREA 4C: 16.3 CM2
ECHO LA MAJOR AXIS: 5.1 CM
ECHO LA VOL MOD A4C: 40 ML (ref 22–52)
ECHO LA VOLUME INDEX MOD A4C: 20 ML/M2 (ref 16–34)
ECHO LV E' LATERAL VELOCITY: 18 CM/S
ECHO LV E' SEPTAL VELOCITY: 13 CM/S
ECHO LV EDV A2C: 90 ML
ECHO LV EDV A4C: 111 ML
ECHO LV EDV INDEX A4C: 57 ML/M2
ECHO LV EDV NDEX A2C: 46 ML/M2
ECHO LV EJECTION FRACTION A2C: 48 %
ECHO LV EJECTION FRACTION A4C: 44 %
ECHO LV EJECTION FRACTION BIPLANE: 46 % (ref 55–100)
ECHO LV ESV A2C: 47 ML
ECHO LV ESV A4C: 62 ML
ECHO LV ESV INDEX A2C: 24 ML/M2
ECHO LV ESV INDEX A4C: 32 ML/M2
ECHO LV FRACTIONAL SHORTENING: 33 % (ref 28–44)
ECHO LV INTERNAL DIMENSION DIASTOLE INDEX: 2.6 CM/M2
ECHO LV INTERNAL DIMENSION DIASTOLIC: 5.1 CM (ref 3.9–5.3)
ECHO LV INTERNAL DIMENSION SYSTOLIC INDEX: 1.73 CM/M2
ECHO LV INTERNAL DIMENSION SYSTOLIC: 3.4 CM
ECHO LV ISOVOLUMETRIC RELAXATION TIME (IVRT): 63 MS
ECHO LV IVSD: 0.7 CM (ref 0.6–0.9)
ECHO LV MASS 2D: 129.4 G (ref 67–162)
ECHO LV MASS INDEX 2D: 66 G/M2 (ref 43–95)
ECHO LV POSTERIOR WALL DIASTOLIC: 0.8 CM (ref 0.6–0.9)
ECHO LV RELATIVE WALL THICKNESS RATIO: 0.31
ECHO LVOT AREA: 3.1 CM2
ECHO LVOT AV VTI INDEX: 0.81
ECHO LVOT DIAM: 2 CM
ECHO LVOT MEAN GRADIENT: 2 MMHG
ECHO LVOT PEAK GRADIENT: 4 MMHG
ECHO LVOT PEAK VELOCITY: 1 M/S
ECHO LVOT STROKE VOLUME INDEX: 32.4 ML/M2
ECHO LVOT SV: 63.4 ML
ECHO LVOT VTI: 20.2 CM
ECHO MV A VELOCITY: 0.35 M/S
ECHO MV E DECELERATION TIME (DT): 194 MS
ECHO MV E VELOCITY: 0.86 M/S
ECHO MV E/A RATIO: 2.46
ECHO MV E/E' LATERAL: 4.78
ECHO MV E/E' RATIO (AVERAGED): 5.7
ECHO MV E/E' SEPTAL: 6.62
ECHO RV TAPSE: 2.6 CM (ref 1.7–?)

## 2024-06-20 PROCEDURE — 93306 TTE W/DOPPLER COMPLETE: CPT

## 2024-06-20 NOTE — TELEPHONE ENCOUNTER
Attempted to call pt, pt lio answered. Lio is going to give message to patient to return call to office regarding test results.

## 2024-06-20 NOTE — TELEPHONE ENCOUNTER
----- Message from Candido Chavez MD sent at 6/20/2024  2:27 PM EDT -----  Please inform patient echo showed mildly reduced LVEF which is abnormal, so further testing is needed to evaluate.  Schedule cardiac CTA urgently and give single dose of Toprol XL 50mg daily.      Thanks  ----- Message -----  From: Jamar Mi MD  Sent: 6/20/2024   1:51 PM EDT  To: Candido Chavez MD

## 2024-06-21 RX ORDER — METOPROLOL SUCCINATE 50 MG/1
TABLET, EXTENDED RELEASE ORAL
Qty: 1 TABLET | Refills: 0 | Status: SHIPPED | OUTPATIENT
Start: 2024-06-21

## 2024-06-21 NOTE — TELEPHONE ENCOUNTER
Pt returned call. Provided echo results. V/U. Educated pt the evening before cardiac CTA take 50 mg toprol xl around 9pm. Provided CS # to schedule. V/U.

## 2024-06-25 ENCOUNTER — TELEPHONE (OUTPATIENT)
Dept: CARDIOLOGY CLINIC | Age: 24
End: 2024-06-25

## 2024-06-25 ENCOUNTER — HOSPITAL ENCOUNTER (OUTPATIENT)
Dept: CT IMAGING | Age: 24
Discharge: HOME | End: 2024-06-25
Attending: INTERNAL MEDICINE
Payer: COMMERCIAL

## 2024-06-25 VITALS
HEART RATE: 63 BPM | BODY MASS INDEX: 38.83 KG/M2 | HEIGHT: 62 IN | DIASTOLIC BLOOD PRESSURE: 63 MMHG | WEIGHT: 211 LBS | RESPIRATION RATE: 18 BRPM | SYSTOLIC BLOOD PRESSURE: 109 MMHG

## 2024-06-25 DIAGNOSIS — R55 NEAR SYNCOPE: ICD-10-CM

## 2024-06-25 DIAGNOSIS — R93.1 ABNORMAL ECHOCARDIOGRAM: ICD-10-CM

## 2024-06-25 PROCEDURE — 75574 CT ANGIO HRT W/3D IMAGE: CPT

## 2024-06-25 PROCEDURE — 6360000004 HC RX CONTRAST MEDICATION: Performed by: INTERNAL MEDICINE

## 2024-06-25 RX ADMIN — IOPAMIDOL 100 ML: 755 INJECTION, SOLUTION INTRAVENOUS at 09:15

## 2024-06-25 ASSESSMENT — PAIN - FUNCTIONAL ASSESSMENT: PAIN_FUNCTIONAL_ASSESSMENT: NONE - DENIES PAIN

## 2024-06-25 NOTE — TELEPHONE ENCOUNTER
----- Message from Candido Chavez MD sent at 6/25/2024  2:14 PM EDT -----  Please inform patient I personally reviewed all of her cardiac imaging results.  Her echo that was previously reported as mildly impaired is incorrect.  Patient has normal LV systolic and diastolic function and normal valve function.      Furthermore, her cardiac CTA is normal with no coronary abnormality seen.      She should keep her follow-up as scheduled and let us know if she has any questions.  Thanks!    ----- Message -----  From: Maddie Parkinson Incoming Radiology Results From [facility]  Sent: 6/25/2024  10:53 AM EDT  To: Candido Chavez MD

## 2024-06-25 NOTE — PROGRESS NOTES
Patient arrived for Coronary CTA.     History and medications were reviewed with patient. Patient denies questions or concerns at this time.     Red dye, Adhesive tape, and Penicillins    No past medical history on file.    Prior to Admission medications    Medication Sig Start Date End Date Taking? Authorizing Provider   metoprolol succinate (TOPROL XL) 50 MG extended release tablet Take 50 mg the evening before your CTA at 9 pm. 6/21/24   Candido Chavez MD   lamoTRIgine (LAMICTAL) 25 MG tablet  4/24/24   ProviderJojo MD   ondansetron (ZOFRAN-ODT) 4 MG disintegrating tablet Take 1 tablet by mouth 3 times daily as needed for Nausea or Vomiting  Patient not taking: Reported on 5/2/2024 12/28/23   Libby Medina MD       Vitals:    06/25/24 0841   BP: 109/63   Pulse: 63   Resp: 18       Patient took 50 mg Metoprolol  last night: Yes   Patient took Metoprolol this morning: No    IV placed: 20 G to Right AC    Current heart rhythm: sinus bradycardia    Pt transported to CT.

## 2024-06-25 NOTE — DISCHARGE INSTRUCTIONS
do not go away with rest or are not getting better within 5 minutes after you take a dose of nitroglycerin.   Call your doctor now or seek immediate medical care if:    You have signs of infection, such as:  Increased pain, swelling, warmth, or redness.  Red streaks leading from the catheter site.  Pus draining from the catheter site.  A fever.     Your leg, arm, or hand is painful, looks blue, or feels cold, numb, or tingly.   Watch closely for changes in your health, and be sure to contact your doctor if you have any problems.  Where can you learn more?    [email or web address]    If you do not have an account, please click on the \"Sign Up Now\" link.  Current as of: August 31, 2020               Content Version: 12.9  © 8127-1440 [employer].   Care instructions adapted under license by [employer]. If you have questions about a medical condition or this instruction, always ask your healthcare professional. [employer] disclaims any warranty or liability for your use of this information.

## 2024-07-29 ENCOUNTER — HOSPITAL ENCOUNTER (EMERGENCY)
Age: 24
Discharge: HOME | End: 2024-07-29
Payer: COMMERCIAL

## 2024-07-29 ENCOUNTER — APPOINTMENT (OUTPATIENT)
Dept: GENERAL RADIOLOGY | Age: 24
End: 2024-07-29
Payer: COMMERCIAL

## 2024-07-29 VITALS
TEMPERATURE: 98.1 F | HEART RATE: 68 BPM | BODY MASS INDEX: 41.42 KG/M2 | HEIGHT: 62 IN | RESPIRATION RATE: 16 BRPM | DIASTOLIC BLOOD PRESSURE: 67 MMHG | WEIGHT: 225.09 LBS | OXYGEN SATURATION: 99 % | SYSTOLIC BLOOD PRESSURE: 131 MMHG

## 2024-07-29 DIAGNOSIS — M79.671 RIGHT FOOT PAIN: Primary | ICD-10-CM

## 2024-07-29 PROCEDURE — 73630 X-RAY EXAM OF FOOT: CPT

## 2024-07-29 PROCEDURE — 99283 EMERGENCY DEPT VISIT LOW MDM: CPT

## 2024-07-29 RX ORDER — NAPROXEN 500 MG/1
500 TABLET ORAL 2 TIMES DAILY PRN
Qty: 20 TABLET | Refills: 0 | Status: SHIPPED | OUTPATIENT
Start: 2024-07-29 | End: 2024-08-08

## 2024-07-29 ASSESSMENT — PAIN DESCRIPTION - ONSET: ONSET: ON-GOING

## 2024-07-29 ASSESSMENT — PAIN DESCRIPTION - DESCRIPTORS: DESCRIPTORS: THROBBING

## 2024-07-29 ASSESSMENT — PAIN DESCRIPTION - ORIENTATION: ORIENTATION: RIGHT

## 2024-07-29 ASSESSMENT — PAIN SCALES - GENERAL
PAINLEVEL_OUTOF10: 5
PAINLEVEL_OUTOF10: 5

## 2024-07-29 ASSESSMENT — PAIN - FUNCTIONAL ASSESSMENT
PAIN_FUNCTIONAL_ASSESSMENT: ACTIVITIES ARE NOT PREVENTED
PAIN_FUNCTIONAL_ASSESSMENT: 0-10
PAIN_FUNCTIONAL_ASSESSMENT: 0-10

## 2024-07-29 ASSESSMENT — PAIN DESCRIPTION - FREQUENCY: FREQUENCY: CONTINUOUS

## 2024-07-29 ASSESSMENT — PAIN DESCRIPTION - LOCATION: LOCATION: FOOT

## 2024-07-29 ASSESSMENT — PAIN DESCRIPTION - PAIN TYPE: TYPE: ACUTE PAIN

## 2024-07-29 NOTE — ED PROVIDER NOTES
[facility] EMERGENCY DEPARTMENT  EMERGENCY DEPARTMENT ENCOUNTER      Pt Name: Tika Cotto  MRN: [id number]  Birthdate 2000  Date of evaluation: 7/29/2024  Provider: HANH Zuniga  PCP: Rimma Brandt APRN - CNP  Note Started: 6:36 PM EDT     The ED Attending Physician was available for consultation but did not see or evaluate this patient.    CHIEF COMPLAINT       Chief Complaint   Patient presents with    Foot Pain     right       HISTORY OF PRESENT ILLNESS   (Location, Timing/Onset, Context/Setting, Quality, Duration, Modifying Factors, Severity, Associated Signs and Symptoms)  Note limiting factors.     Tika Cotto is a 24 y.o. female who presents with complaint of right foot pain.  Says she has past history of fracture in the foot, 2018, and healed without surgery.  Over the past few weeks she has been developing pain in the foot, mostly with weightbearing.  It is in the arch of the foot and also underneath the MTP joints.  Denies any recent injury.  Denies swelling or numbness.  Says there is not much pain at rest.  Denies any symptoms in the left foot.  Denies any ankle pain or swelling.  No relevant medical problems.    Nursing Notes were all reviewed and agreed with or any disagreements were addressed in the HPI.    REVIEW OF SYSTEMS    (2-9 systems for level 4, 10 or more for level 5)     Positives and pertinent negatives as per HPI.     PAST MEDICAL HISTORY   History reviewed. No pertinent past medical history.    SURGICAL HISTORY     Past Surgical History:   Procedure Laterality Date    CHOLECYSTECTOMY         CURRENTMEDICATIONS       Previous Medications    LAMOTRIGINE (LAMICTAL) 25 MG TABLET        METOPROLOL SUCCINATE (TOPROL XL) 50 MG EXTENDED RELEASE TABLET    Take 50 mg the evening before your CTA at 9 pm.    ONDANSETRON (ZOFRAN-ODT) 4 MG DISINTEGRATING TABLET    Take 1 tablet by mouth 3 times daily as needed for Nausea or Vomiting       ALLERGIES     Red dye, Adhesive tape, and

## 2024-07-31 NOTE — PROGRESS NOTES
[facility]   Electrophysiology Consult Note              Date: 8/1/24  Patient Name: Tika Cotto  YOB: 2000    Primary Care Physician: Rimma Brandt, BETITO - CNP    CHIEF COMPLAINT:   Chief Complaint   Patient presents with    New Patient    Dizziness     Sitting to standing pt reports getting dizzy     Other     Pt reports she has past out in past      HISTORY OF PRESENT ILLNESS: Tika Cotto is a 24 y.o. female with a PMH significant for syncope. Has worn a previous holter monitor in 2021 that showed NSR. Patient was seen by cardiology in 5/2024 for syncope. Echo 6/20/2024 showed EF 45-50%. She then had cardiac CTA 6/25/2024 showed calcium score of 0.    Today, 8/1/2024, EKG demonstrates SR 67 bpm. She reports a history of syncope, but no syncope for a couple of years now. She feels dizzy with position changes. She is taking her medications as prescribed. Patient denies current edema, chest pain, shortness of breath, palpitations, dizziness or syncope.     Past Medical History:   has no past medical history on file.    Past Surgical History:   has a past surgical history that includes Cholecystectomy.     Allergies:  Red dye, Adhesive tape, and Penicillins    Social History:   reports that she has never smoked. She does not have any smokeless tobacco history on file. She reports that she does not drink alcohol and does not use drugs.     Family History: family history is not on file.    Home Medications:    Prior to Admission medications    Medication Sig Start Date End Date Taking? Authorizing Provider   lamoTRIgine (LAMICTAL) 25 MG tablet  4/24/24  Yes Provider, MD Jojo   naproxen (NAPROSYN) 500 MG tablet Take 1 tablet by mouth 2 times daily as needed for Pain 7/29/24 8/8/24  Lc Kern PA   metoprolol succinate (TOPROL XL) 50 MG extended release tablet Take 50 mg the evening before your CTA at 9 pm. 6/21/24   Candido Chavez MD   ondansetron (ZOFRAN-ODT) 4 MG

## 2024-08-01 ENCOUNTER — OFFICE VISIT (OUTPATIENT)
Dept: CARDIOLOGY CLINIC | Age: 24
End: 2024-08-01
Payer: COMMERCIAL

## 2024-08-01 VITALS
HEIGHT: 62 IN | HEART RATE: 67 BPM | WEIGHT: 222.2 LBS | DIASTOLIC BLOOD PRESSURE: 62 MMHG | BODY MASS INDEX: 40.89 KG/M2 | SYSTOLIC BLOOD PRESSURE: 116 MMHG | OXYGEN SATURATION: 99 %

## 2024-08-01 DIAGNOSIS — R55 NEAR SYNCOPE: Primary | ICD-10-CM

## 2024-08-01 DIAGNOSIS — I49.9 IRREGULAR HEART RATE: ICD-10-CM

## 2024-08-01 PROCEDURE — 93000 ELECTROCARDIOGRAM COMPLETE: CPT | Performed by: INTERNAL MEDICINE

## 2024-08-01 PROCEDURE — 99244 OFF/OP CNSLTJ NEW/EST MOD 40: CPT | Performed by: INTERNAL MEDICINE

## 2024-08-01 PROCEDURE — G8417 CALC BMI ABV UP PARAM F/U: HCPCS | Performed by: INTERNAL MEDICINE

## 2024-08-01 PROCEDURE — G8427 DOCREV CUR MEDS BY ELIG CLIN: HCPCS | Performed by: INTERNAL MEDICINE

## 2024-08-01 NOTE — PATIENT INSTRUCTIONS
RECOMMENDATIONS:  Likely orthostatic hypotension.   Recommend compression stockings.  Increase fluid intake.   Increase salt intake.   Monitor clinically for symptoms, let us know if anything worsening.   Follow up in 6 months.

## 2024-09-02 ENCOUNTER — HOSPITAL ENCOUNTER (EMERGENCY)
Age: 24
Discharge: HOME | End: 2024-09-03
Attending: EMERGENCY MEDICINE
Payer: COMMERCIAL

## 2024-09-02 ENCOUNTER — APPOINTMENT (OUTPATIENT)
Dept: CT IMAGING | Age: 24
End: 2024-09-02
Payer: COMMERCIAL

## 2024-09-02 VITALS
SYSTOLIC BLOOD PRESSURE: 129 MMHG | WEIGHT: 220.68 LBS | HEIGHT: 62 IN | RESPIRATION RATE: 16 BRPM | TEMPERATURE: 99.1 F | HEART RATE: 63 BPM | BODY MASS INDEX: 40.61 KG/M2 | DIASTOLIC BLOOD PRESSURE: 86 MMHG | OXYGEN SATURATION: 99 %

## 2024-09-02 DIAGNOSIS — R11.2 NAUSEA AND VOMITING, UNSPECIFIED VOMITING TYPE: ICD-10-CM

## 2024-09-02 DIAGNOSIS — R10.9 FLANK PAIN: Primary | ICD-10-CM

## 2024-09-02 LAB
BASOPHILS # BLD: 0.1 K/UL (ref 0–0.2)
BASOPHILS NFR BLD: 1.6 %
DEPRECATED RDW RBC AUTO: 13.6 % (ref 12.4–15.4)
EOSINOPHIL # BLD: 0.2 K/UL (ref 0–0.6)
EOSINOPHIL NFR BLD: 2.7 %
HCG SERPL QL: NEGATIVE
HCT VFR BLD AUTO: 39.7 % (ref 36–48)
HGB BLD-MCNC: 13.7 G/DL (ref 12–16)
LYMPHOCYTES # BLD: 2.7 K/UL (ref 1–5.1)
LYMPHOCYTES NFR BLD: 34.7 %
MCH RBC QN AUTO: 33 PG (ref 26–34)
MCHC RBC AUTO-ENTMCNC: 34.5 G/DL (ref 31–36)
MCV RBC AUTO: 95.8 FL (ref 80–100)
MONOCYTES # BLD: 0.5 K/UL (ref 0–1.3)
MONOCYTES NFR BLD: 6.1 %
NEUTROPHILS # BLD: 4.3 K/UL (ref 1.7–7.7)
NEUTROPHILS NFR BLD: 54.9 %
PLATELET # BLD AUTO: 367 K/UL (ref 135–450)
PMV BLD AUTO: 7.8 FL (ref 5–10.5)
RBC # BLD AUTO: 4.15 M/UL (ref 4–5.2)
WBC # BLD AUTO: 7.8 K/UL (ref 4–11)

## 2024-09-02 PROCEDURE — 99284 EMERGENCY DEPT VISIT MOD MDM: CPT

## 2024-09-02 PROCEDURE — 96374 THER/PROPH/DIAG INJ IV PUSH: CPT

## 2024-09-02 PROCEDURE — 81001 URINALYSIS AUTO W/SCOPE: CPT

## 2024-09-02 PROCEDURE — 80053 COMPREHEN METABOLIC PANEL: CPT

## 2024-09-02 PROCEDURE — 96375 TX/PRO/DX INJ NEW DRUG ADDON: CPT

## 2024-09-02 PROCEDURE — 2580000003 HC RX 258: Performed by: EMERGENCY MEDICINE

## 2024-09-02 PROCEDURE — 6360000002 HC RX W HCPCS: Performed by: EMERGENCY MEDICINE

## 2024-09-02 PROCEDURE — 83690 ASSAY OF LIPASE: CPT

## 2024-09-02 PROCEDURE — 74176 CT ABD & PELVIS W/O CONTRAST: CPT

## 2024-09-02 PROCEDURE — 84703 CHORIONIC GONADOTROPIN ASSAY: CPT

## 2024-09-02 PROCEDURE — 85025 COMPLETE CBC W/AUTO DIFF WBC: CPT

## 2024-09-02 RX ORDER — 0.9 % SODIUM CHLORIDE 0.9 %
1000 INTRAVENOUS SOLUTION INTRAVENOUS ONCE
Status: COMPLETED | OUTPATIENT
Start: 2024-09-02 | End: 2024-09-03

## 2024-09-02 RX ORDER — ONDANSETRON 2 MG/ML
8 INJECTION INTRAMUSCULAR; INTRAVENOUS ONCE
Status: COMPLETED | OUTPATIENT
Start: 2024-09-02 | End: 2024-09-02

## 2024-09-02 RX ORDER — KETOROLAC TROMETHAMINE 15 MG/ML
15 INJECTION, SOLUTION INTRAMUSCULAR; INTRAVENOUS ONCE
Status: COMPLETED | OUTPATIENT
Start: 2024-09-02 | End: 2024-09-02

## 2024-09-02 RX ADMIN — SODIUM CHLORIDE 1000 ML: 9 INJECTION, SOLUTION INTRAVENOUS at 23:56

## 2024-09-02 RX ADMIN — ONDANSETRON 8 MG: 2 INJECTION INTRAMUSCULAR; INTRAVENOUS at 23:55

## 2024-09-02 RX ADMIN — KETOROLAC TROMETHAMINE 15 MG: 15 INJECTION INTRAMUSCULAR at 23:55

## 2024-09-02 ASSESSMENT — LIFESTYLE VARIABLES
HOW OFTEN DO YOU HAVE A DRINK CONTAINING ALCOHOL: 2-4 TIMES A MONTH
HOW MANY STANDARD DRINKS CONTAINING ALCOHOL DO YOU HAVE ON A TYPICAL DAY: 1 OR 2

## 2024-09-02 ASSESSMENT — PAIN SCALES - GENERAL: PAINLEVEL_OUTOF10: 8

## 2024-09-02 ASSESSMENT — PAIN - FUNCTIONAL ASSESSMENT: PAIN_FUNCTIONAL_ASSESSMENT: 0-10

## 2024-09-02 ASSESSMENT — PAIN DESCRIPTION - LOCATION: LOCATION: FLANK

## 2024-09-03 LAB
ALBUMIN SERPL-MCNC: 4.8 G/DL (ref 3.4–5)
ALBUMIN/GLOB SERPL: 1.5 {RATIO} (ref 1.1–2.2)
ALP SERPL-CCNC: 86 U/L (ref 40–129)
ALT SERPL-CCNC: 23 U/L (ref 10–40)
ANION GAP SERPL CALCULATED.3IONS-SCNC: 11 MMOL/L (ref 3–16)
AST SERPL-CCNC: 21 U/L (ref 15–37)
BILIRUB SERPL-MCNC: 0.4 MG/DL (ref 0–1)
BILIRUB UR QL STRIP.AUTO: NEGATIVE
BUN SERPL-MCNC: 8 MG/DL (ref 7–20)
CALCIUM SERPL-MCNC: 9.6 MG/DL (ref 8.3–10.6)
CHLORIDE SERPL-SCNC: 104 MMOL/L (ref 99–110)
CLARITY UR: CLEAR
CO2 SERPL-SCNC: 22 MMOL/L (ref 21–32)
COLOR UR: YELLOW
CREAT SERPL-MCNC: 0.6 MG/DL (ref 0.6–1.1)
EPI CELLS #/AREA URNS HPF: NORMAL /HPF (ref 0–5)
GFR SERPLBLD CREATININE-BSD FMLA CKD-EPI: >90 ML/MIN/{1.73_M2}
GLUCOSE SERPL-MCNC: 92 MG/DL (ref 70–99)
GLUCOSE UR STRIP.AUTO-MCNC: NEGATIVE MG/DL
HGB UR QL STRIP.AUTO: ABNORMAL
KETONES UR STRIP.AUTO-MCNC: NEGATIVE MG/DL
LEUKOCYTE ESTERASE UR QL STRIP.AUTO: NEGATIVE
LIPASE SERPL-CCNC: 24 U/L (ref 13–60)
NITRITE UR QL STRIP.AUTO: NEGATIVE
PH UR STRIP.AUTO: 6.5 [PH] (ref 5–8)
POTASSIUM SERPL-SCNC: 4.2 MMOL/L (ref 3.5–5.1)
PROT SERPL-MCNC: 8 G/DL (ref 6.4–8.2)
PROT UR STRIP.AUTO-MCNC: ABNORMAL MG/DL
RBC #/AREA URNS HPF: NORMAL /HPF (ref 0–4)
SODIUM SERPL-SCNC: 137 MMOL/L (ref 136–145)
SP GR UR STRIP.AUTO: >=1.03 (ref 1–1.03)
UA COMPLETE W REFLEX CULTURE PNL UR: ABNORMAL
UA DIPSTICK W REFLEX MICRO PNL UR: YES
URN SPEC COLLECT METH UR: ABNORMAL
UROBILINOGEN UR STRIP-ACNC: 1 E.U./DL
WBC #/AREA URNS HPF: NORMAL /HPF (ref 0–5)

## 2024-09-03 RX ORDER — ONDANSETRON 4 MG/1
2 TABLET, ORALLY DISINTEGRATING ORAL EVERY 12 HOURS PRN
Qty: 21 TABLET | Refills: 0 | Status: SHIPPED | OUTPATIENT
Start: 2024-09-03

## 2024-09-03 RX ORDER — KETOROLAC TROMETHAMINE 10 MG/1
10 TABLET, FILM COATED ORAL EVERY 6 HOURS PRN
Qty: 20 TABLET | Refills: 0 | Status: SHIPPED | OUTPATIENT
Start: 2024-09-03

## 2025-02-11 ENCOUNTER — HOSPITAL ENCOUNTER (EMERGENCY)
Age: 25
Discharge: HOME | End: 2025-02-11
Attending: EMERGENCY MEDICINE
Payer: COMMERCIAL

## 2025-02-11 VITALS
HEART RATE: 97 BPM | DIASTOLIC BLOOD PRESSURE: 92 MMHG | RESPIRATION RATE: 18 BRPM | OXYGEN SATURATION: 94 % | HEIGHT: 63 IN | BODY MASS INDEX: 40.5 KG/M2 | SYSTOLIC BLOOD PRESSURE: 155 MMHG | TEMPERATURE: 97.9 F | WEIGHT: 228.6 LBS

## 2025-02-11 DIAGNOSIS — B34.9 VIRAL SYNDROME: Primary | ICD-10-CM

## 2025-02-11 LAB
FLUAV RNA UPPER RESP QL NAA+PROBE: NEGATIVE
FLUBV AG NPH QL: NEGATIVE
SARS-COV-2 RDRP RESP QL NAA+PROBE: NOT DETECTED

## 2025-02-11 PROCEDURE — 6370000000 HC RX 637 (ALT 250 FOR IP): Performed by: EMERGENCY MEDICINE

## 2025-02-11 PROCEDURE — 87804 INFLUENZA ASSAY W/OPTIC: CPT

## 2025-02-11 PROCEDURE — 99283 EMERGENCY DEPT VISIT LOW MDM: CPT

## 2025-02-11 PROCEDURE — 87635 SARS-COV-2 COVID-19 AMP PRB: CPT

## 2025-02-11 RX ORDER — ONDANSETRON 4 MG/1
4 TABLET, ORALLY DISINTEGRATING ORAL 3 TIMES DAILY PRN
Qty: 10 TABLET | Refills: 0 | Status: SHIPPED | OUTPATIENT
Start: 2025-02-11

## 2025-02-11 RX ORDER — FLUOXETINE 10 MG/1
10 CAPSULE ORAL DAILY
COMMUNITY

## 2025-02-11 RX ORDER — PROPRANOLOL HYDROCHLORIDE 10 MG/1
10 TABLET ORAL 2 TIMES DAILY
COMMUNITY

## 2025-02-11 RX ORDER — ONDANSETRON 4 MG/1
4 TABLET, ORALLY DISINTEGRATING ORAL ONCE
Status: COMPLETED | OUTPATIENT
Start: 2025-02-11 | End: 2025-02-11

## 2025-02-11 RX ORDER — ARIPIPRAZOLE 10 MG/1
10 TABLET ORAL DAILY
COMMUNITY

## 2025-02-11 RX ORDER — LAMOTRIGINE 150 MG/1
150 TABLET ORAL DAILY
COMMUNITY

## 2025-02-11 RX ADMIN — ONDANSETRON 4 MG: 4 TABLET, ORALLY DISINTEGRATING ORAL at 00:51

## 2025-02-11 ASSESSMENT — PAIN DESCRIPTION - DESCRIPTORS
DESCRIPTORS: ACHING
DESCRIPTORS: ACHING

## 2025-02-11 ASSESSMENT — PAIN DESCRIPTION - LOCATION
LOCATION: GENERALIZED
LOCATION: GENERALIZED

## 2025-02-11 ASSESSMENT — PAIN SCALES - GENERAL
PAINLEVEL_OUTOF10: 7
PAINLEVEL_OUTOF10: 7

## 2025-02-11 ASSESSMENT — PAIN - FUNCTIONAL ASSESSMENT
PAIN_FUNCTIONAL_ASSESSMENT: 0-10
PAIN_FUNCTIONAL_ASSESSMENT: 0-10

## 2025-02-11 ASSESSMENT — LIFESTYLE VARIABLES
HOW MANY STANDARD DRINKS CONTAINING ALCOHOL DO YOU HAVE ON A TYPICAL DAY: 1 OR 2
HOW OFTEN DO YOU HAVE A DRINK CONTAINING ALCOHOL: MONTHLY OR LESS

## 2025-02-11 NOTE — ED PROVIDER NOTES
[facility] Emergency Department      Pt Name: Tika Cotto  MRN: [id number]  Birthdate 2000  Date of evaluation: 2/11/2025  Provider: ELDER ALBERT MD  CHIEF COMPLAINT  Chief Complaint   Patient presents with    Generalized Body Aches    Chills    Cough    Fever     HPI  Tika Cotto is a 24 y.o. female who presents because of body aches, chills, cough, fever.  She started getting sick yesterday and her kids were sick prior to her illness started.  She says her kids were only sick for 1 day.  She initially ran a temperature of 102.  She has nasal congestion and cough.  She has had some posttussive emesis and is also had nausea with vomiting.  She has not had diarrhea.  Appetite today is decreased.  She is generally fatigued.  She has had some discomfort with her ears also.    REVIEW OF SYSTEMS:  See HPI for further details. Remainder of pertinent ROS reviewed and negative.  Nursing notes reviewed.    PAST MEDICAL HISTORY  No past medical history on file.  SURGICAL HISTORY  Past Surgical History:   Procedure Laterality Date    CHOLECYSTECTOMY       MEDICATIONS:  No current facility-administered medications on file prior to encounter.     Current Outpatient Medications on File Prior to Encounter   Medication Sig Dispense Refill    lamoTRIgine (LAMICTAL) 150 MG tablet Take 1 tablet by mouth daily      ARIPiprazole (ABILIFY) 10 MG tablet Take 1 tablet by mouth daily      FLUoxetine (PROZAC) 10 MG capsule Take 1 capsule by mouth daily      propranolol (INDERAL) 10 MG tablet Take 1 tablet by mouth 2 times daily      ketorolac (TORADOL) 10 MG tablet Take 1 tablet by mouth every 6 hours as needed for Pain (Patient not taking: Reported on 2/11/2025) 20 tablet 0    ondansetron (ZOFRAN-ODT) 4 MG disintegrating tablet Take 0.5 tablets by mouth every 12 hours as needed for Nausea or Vomiting (Patient not taking: Reported on 2/11/2025) 21 tablet 0    Elastic Bandages & Supports (MEDICAL COMPRESSION STOCKINGS) MISC 1 each

## 2025-02-11 NOTE — ED TRIAGE NOTES
Patient presents to ED with report of fever, chills, cough, nausea, vomiting for 3 days. Reports intermittent generalized body aches. Reports her children have been sick with diarrhea/vomiting.

## 2025-02-18 ENCOUNTER — HOSPITAL ENCOUNTER (EMERGENCY)
Age: 25
Discharge: HOME | End: 2025-02-18
Attending: EMERGENCY MEDICINE
Payer: COMMERCIAL

## 2025-02-18 VITALS
HEART RATE: 94 BPM | OXYGEN SATURATION: 96 % | RESPIRATION RATE: 16 BRPM | SYSTOLIC BLOOD PRESSURE: 141 MMHG | WEIGHT: 224 LBS | DIASTOLIC BLOOD PRESSURE: 81 MMHG | TEMPERATURE: 98.1 F | BODY MASS INDEX: 39.68 KG/M2

## 2025-02-18 DIAGNOSIS — J06.9 ACUTE UPPER RESPIRATORY INFECTION: ICD-10-CM

## 2025-02-18 DIAGNOSIS — H66.91 RIGHT OTITIS MEDIA, UNSPECIFIED OTITIS MEDIA TYPE: Primary | ICD-10-CM

## 2025-02-18 DIAGNOSIS — R11.0 NAUSEA: ICD-10-CM

## 2025-02-18 LAB
AMORPH SED URNS QL MICRO: ABNORMAL /HPF
BACTERIA URNS QL MICRO: ABNORMAL /HPF
BILIRUB UR QL STRIP.AUTO: NEGATIVE
CLARITY UR: ABNORMAL
COLOR UR: YELLOW
CRYSTALS URNS MICRO: ABNORMAL /HPF
EPI CELLS #/AREA URNS HPF: ABNORMAL /HPF (ref 0–5)
FLUAV RNA UPPER RESP QL NAA+PROBE: NEGATIVE
FLUBV AG NPH QL: NEGATIVE
GLUCOSE UR STRIP.AUTO-MCNC: NEGATIVE MG/DL
HGB UR QL STRIP.AUTO: ABNORMAL
KETONES UR STRIP.AUTO-MCNC: NEGATIVE MG/DL
LEUKOCYTE ESTERASE UR QL STRIP.AUTO: NEGATIVE
MUCOUS THREADS #/AREA URNS LPF: ABNORMAL /LPF
NITRITE UR QL STRIP.AUTO: NEGATIVE
PH UR STRIP.AUTO: 6 [PH] (ref 5–8)
PROT UR STRIP.AUTO-MCNC: NEGATIVE MG/DL
RBC #/AREA URNS HPF: ABNORMAL /HPF (ref 0–4)
SARS-COV-2 RDRP RESP QL NAA+PROBE: NOT DETECTED
SP GR UR STRIP.AUTO: 1.02 (ref 1–1.03)
UA COMPLETE W REFLEX CULTURE PNL UR: ABNORMAL
UA DIPSTICK W REFLEX MICRO PNL UR: YES
URN SPEC COLLECT METH UR: ABNORMAL
UROBILINOGEN UR STRIP-ACNC: 0.2 E.U./DL
WBC #/AREA URNS HPF: ABNORMAL /HPF (ref 0–5)

## 2025-02-18 PROCEDURE — 87635 SARS-COV-2 COVID-19 AMP PRB: CPT

## 2025-02-18 PROCEDURE — 87804 INFLUENZA ASSAY W/OPTIC: CPT

## 2025-02-18 PROCEDURE — 81001 URINALYSIS AUTO W/SCOPE: CPT

## 2025-02-18 PROCEDURE — 6370000000 HC RX 637 (ALT 250 FOR IP): Performed by: EMERGENCY MEDICINE

## 2025-02-18 PROCEDURE — 99283 EMERGENCY DEPT VISIT LOW MDM: CPT

## 2025-02-18 RX ORDER — ACETAMINOPHEN 325 MG/1
650 TABLET ORAL ONCE
Status: COMPLETED | OUTPATIENT
Start: 2025-02-18 | End: 2025-02-18

## 2025-02-18 RX ORDER — FLUTICASONE PROPIONATE 50 MCG
2 SPRAY, SUSPENSION (ML) NASAL DAILY
Qty: 16 G | Refills: 0 | Status: SHIPPED | OUTPATIENT
Start: 2025-02-18

## 2025-02-18 RX ORDER — CEFUROXIME AXETIL 250 MG/1
500 TABLET ORAL EVERY 12 HOURS SCHEDULED
Status: DISCONTINUED | OUTPATIENT
Start: 2025-02-18 | End: 2025-02-18 | Stop reason: HOSPADM

## 2025-02-18 RX ORDER — IBUPROFEN 600 MG/1
600 TABLET, FILM COATED ORAL 4 TIMES DAILY PRN
Qty: 40 TABLET | Refills: 0 | Status: SHIPPED | OUTPATIENT
Start: 2025-02-18

## 2025-02-18 RX ORDER — PROMETHAZINE HYDROCHLORIDE 25 MG/1
25 TABLET ORAL 4 TIMES DAILY PRN
Qty: 12 TABLET | Refills: 0 | Status: SHIPPED | OUTPATIENT
Start: 2025-02-18 | End: 2025-02-21

## 2025-02-18 RX ORDER — PSEUDOEPHEDRINE HCL 120 MG/1
120 TABLET, FILM COATED, EXTENDED RELEASE ORAL EVERY 12 HOURS
Qty: 6 TABLET | Refills: 0 | Status: SHIPPED | OUTPATIENT
Start: 2025-02-18 | End: 2025-02-21

## 2025-02-18 RX ORDER — PREDNISONE 10 MG/1
TABLET ORAL
Qty: 20 TABLET | Refills: 0 | Status: SHIPPED | OUTPATIENT
Start: 2025-02-18 | End: 2025-02-25

## 2025-02-18 RX ORDER — PSEUDOEPHEDRINE HCL 30 MG/1
60 TABLET, FILM COATED ORAL ONCE
Status: COMPLETED | OUTPATIENT
Start: 2025-02-18 | End: 2025-02-18

## 2025-02-18 RX ORDER — PROMETHAZINE HYDROCHLORIDE 25 MG/1
25 TABLET ORAL ONCE
Status: COMPLETED | OUTPATIENT
Start: 2025-02-18 | End: 2025-02-18

## 2025-02-18 RX ORDER — CEFUROXIME AXETIL 500 MG/1
500 TABLET ORAL 2 TIMES DAILY
Qty: 14 TABLET | Refills: 0 | Status: SHIPPED | OUTPATIENT
Start: 2025-02-18 | End: 2025-02-25

## 2025-02-18 RX ORDER — ACETAMINOPHEN 325 MG/1
650 TABLET ORAL EVERY 6 HOURS PRN
Qty: 120 TABLET | Refills: 3 | Status: SHIPPED | OUTPATIENT
Start: 2025-02-18

## 2025-02-18 RX ADMIN — PROMETHAZINE HYDROCHLORIDE 25 MG: 25 TABLET ORAL at 07:40

## 2025-02-18 RX ADMIN — PSEUDOEPHEDRINE HCL 60 MG: 30 TABLET, FILM COATED ORAL at 07:40

## 2025-02-18 RX ADMIN — CEFUROXIME AXETIL 500 MG: 250 TABLET, FILM COATED ORAL at 07:41

## 2025-02-18 RX ADMIN — ACETAMINOPHEN 650 MG: 325 TABLET ORAL at 07:41

## 2025-02-18 ASSESSMENT — ENCOUNTER SYMPTOMS
ABDOMINAL PAIN: 0
RHINORRHEA: 0
EYE ITCHING: 0
DIARRHEA: 0
SORE THROAT: 1
VOMITING: 0
NAUSEA: 1
CHEST TIGHTNESS: 0
EYE PAIN: 0
SHORTNESS OF BREATH: 0
COUGH: 1
BACK PAIN: 0

## 2025-02-18 ASSESSMENT — PAIN - FUNCTIONAL ASSESSMENT
PAIN_FUNCTIONAL_ASSESSMENT: NONE - DENIES PAIN
PAIN_FUNCTIONAL_ASSESSMENT: 0-10

## 2025-02-18 ASSESSMENT — PAIN DESCRIPTION - PAIN TYPE: TYPE: ACUTE PAIN

## 2025-02-18 ASSESSMENT — PAIN DESCRIPTION - FREQUENCY: FREQUENCY: CONTINUOUS

## 2025-02-18 ASSESSMENT — PAIN DESCRIPTION - ORIENTATION: ORIENTATION: RIGHT

## 2025-02-18 ASSESSMENT — PAIN DESCRIPTION - DESCRIPTORS: DESCRIPTORS: ACHING

## 2025-02-18 ASSESSMENT — PAIN SCALES - GENERAL: PAINLEVEL_OUTOF10: 3

## 2025-02-18 ASSESSMENT — PAIN DESCRIPTION - LOCATION: LOCATION: EAR

## 2025-02-18 NOTE — DISCHARGE INSTR - COC
Continuity of Care Form    Patient Name: Tika Cotto   :  2000  MRN:  [id number]    Admit date:  2025  Discharge date:  ***    Code Status Order: No Order   Advance Directives:   Advance Care Flowsheet Documentation             Admitting Physician:  No admitting provider for patient encounter.  PCP: Rimma Brandt APRN - CNP    Discharging Nurse: ***  Discharging Hospital Unit/Room#:   Discharging Unit Phone Number: ***    Emergency Contact:   Extended Emergency Contact Information  Primary Emergency Contact: Sukhjinder Cruz  Address: [address] [place] [address] of [place]  Home Phone: [phone]  Mobile Phone: [phone]  Relation: Spouse    Past Surgical History:  Past Surgical History:   Procedure Laterality Date    CHOLECYSTECTOMY         Immunization History:     There is no immunization history on file for this patient.    Active Problems:  Patient Active Problem List   Diagnosis Code    Class II obesity E66.812    Near syncope R55    Dizziness R42    Establishing care with new doctor, encounter for Z76.89       Isolation/Infection:   Isolation            No Isolation          Patient Infection Status       None to display                     Nurse Assessment:  Last Vital Signs: BP (!) 141/81   Pulse 94   Temp 98.1 °F (36.7 °C) (Oral)   Resp 16   Wt 101.6 kg (224 lb)   SpO2 96%   BMI 39.68 kg/m²     Last documented pain score (0-10 scale): Pain Level: 3  Last Weight:   Wt Readings from Last 1 Encounters:   25 101.6 kg (224 lb)     Mental Status:  {IP PT MENTAL STATUS:35081}    IV Access:  { RAMIRO IV ACCESS:757585985}    Nursing Mobility/ADLs:  Walking   {CHP DME ADLs:903542812}  Transfer  {CHP DME ADLs:827621721}  Bathing  {CHP DME ADLs:884492499}  Dressing  {CHP DME ADLs:609592493}  Toileting  {CHP DME ADLs:227920693}  Feeding  {CHP DME ADLs:411882415}  Med Admin  {CHP DME ADLs:990088215}  Med Delivery   { RAMIRO MED Delivery:885961947}    Wound

## 2025-02-18 NOTE — ED PROVIDER NOTES
Emergency Department Attending Physician Note  Location: [facility] EMERGENCY DEPARTMENT  2/18/2025       Pt Name: Tika Cotto  MRN: [id number]  Birthdate 2000    Date of evaluation: 2/18/2025  Provider: BOOKER FUNES DO  PCP: Rimma Brandt APRN - CNP    Note Started: 7:00 AM EST 2/18/25    CHIEF COMPLAINT  Chief Complaint   Patient presents with    Nausea    Ear Pain     Right ear pain       ROOM: 1    HISTORY OF PRESENT ILLNESS:  History obtained by patient. Limitations to history : None.     Tika Cotto is a 24 y.o. female with a significant PMHx of previous cholecystectomy, anxiety, and other comorbidities listed below, here in the emergency department today with concerns of mostly right ear pain, but has been congested, and having intermittent fevers at home.  She took Tylenol last night around 8 PM when she felt really hot, she thinks her temperature was elevated, but she is also quite nauseous, when she takes medicine she feels like she is not to be to keep it down.  She says she has not really been eating and drinking much.  No abdominal pain, but is having a little bit of looser stools.  She has a slight sore throat, but she thinks is from coughing.  Last week, had what she thought was a cold, COVID influenza were negative, and she got better for a couple days, but for the past 3 to 4 days, now this has been going on.  She said she is coughing, but does not feel short of breath.  She recently had some dental work, but says she is completely healed from that, and does not think it is her teeth.  No other concerns including any falls or injuries.  Has a slight headache, but says \"it is all from the pressure and the congestion.\"  No other concerns today in the emergency department including any vision changes, falls, injuries.    Nursing Notes were all reviewed and agreed with or any disagreements were addressed in the HPI.      MEDICAL HISTORY  History reviewed. No pertinent past medical

## 2025-02-18 NOTE — ED NOTES
Patient to ed with complaints of nasal congestion, right ear pain and continued nausea patient also reports she has vomited.

## 2025-04-22 ENCOUNTER — APPOINTMENT (OUTPATIENT)
Dept: CT IMAGING | Age: 25
End: 2025-04-22
Payer: COMMERCIAL

## 2025-04-22 ENCOUNTER — HOSPITAL ENCOUNTER (EMERGENCY)
Age: 25
Discharge: HOME | End: 2025-04-22
Attending: STUDENT IN AN ORGANIZED HEALTH CARE EDUCATION/TRAINING PROGRAM
Payer: COMMERCIAL

## 2025-04-22 VITALS
OXYGEN SATURATION: 97 % | WEIGHT: 229 LBS | HEART RATE: 67 BPM | RESPIRATION RATE: 18 BRPM | BODY MASS INDEX: 40.57 KG/M2 | SYSTOLIC BLOOD PRESSURE: 101 MMHG | TEMPERATURE: 98.4 F | DIASTOLIC BLOOD PRESSURE: 70 MMHG

## 2025-04-22 DIAGNOSIS — J18.9 PNEUMONIA DUE TO INFECTIOUS ORGANISM, UNSPECIFIED LATERALITY, UNSPECIFIED PART OF LUNG: Primary | ICD-10-CM

## 2025-04-22 DIAGNOSIS — R07.9 CHEST PAIN, UNSPECIFIED TYPE: ICD-10-CM

## 2025-04-22 LAB
ANION GAP SERPL CALCULATED.3IONS-SCNC: 11 MMOL/L (ref 3–16)
BASOPHILS # BLD: 0.2 K/UL (ref 0–0.2)
BASOPHILS NFR BLD: 1.6 %
BUN SERPL-MCNC: 9 MG/DL (ref 7–20)
CALCIUM SERPL-MCNC: 10 MG/DL (ref 8.3–10.6)
CHLORIDE SERPL-SCNC: 102 MMOL/L (ref 99–110)
CO2 SERPL-SCNC: 25 MMOL/L (ref 21–32)
CREAT SERPL-MCNC: 0.8 MG/DL (ref 0.6–1.1)
DEPRECATED RDW RBC AUTO: 15.7 % (ref 12.4–15.4)
EOSINOPHIL # BLD: 0.3 K/UL (ref 0–0.6)
EOSINOPHIL NFR BLD: 3 %
GFR SERPLBLD CREATININE-BSD FMLA CKD-EPI: >90 ML/MIN/{1.73_M2}
GLUCOSE SERPL-MCNC: 91 MG/DL (ref 70–99)
HCT VFR BLD AUTO: 40.6 % (ref 36–48)
HGB BLD-MCNC: 13.8 G/DL (ref 12–16)
LYMPHOCYTES # BLD: 3.2 K/UL (ref 1–5.1)
LYMPHOCYTES NFR BLD: 28.4 %
MCH RBC QN AUTO: 32.8 PG (ref 26–34)
MCHC RBC AUTO-ENTMCNC: 33.9 G/DL (ref 31–36)
MCV RBC AUTO: 96.7 FL (ref 80–100)
MONOCYTES # BLD: 0.5 K/UL (ref 0–1.3)
MONOCYTES NFR BLD: 4.1 %
NEUTROPHILS # BLD: 7 K/UL (ref 1.7–7.7)
NEUTROPHILS NFR BLD: 62.9 %
NT-PROBNP SERPL-MCNC: <36 PG/ML (ref 0–124)
PLATELET # BLD AUTO: 386 K/UL (ref 135–450)
PMV BLD AUTO: 7.4 FL (ref 5–10.5)
POTASSIUM SERPL-SCNC: 4.1 MMOL/L (ref 3.5–5.1)
RBC # BLD AUTO: 4.19 M/UL (ref 4–5.2)
SODIUM SERPL-SCNC: 138 MMOL/L (ref 136–145)
TROPONIN, HIGH SENSITIVITY: <6 NG/L (ref 0–14)
WBC # BLD AUTO: 11.1 K/UL (ref 4–11)

## 2025-04-22 PROCEDURE — 96374 THER/PROPH/DIAG INJ IV PUSH: CPT

## 2025-04-22 PROCEDURE — 83880 ASSAY OF NATRIURETIC PEPTIDE: CPT

## 2025-04-22 PROCEDURE — 94664 DEMO&/EVAL PT USE INHALER: CPT

## 2025-04-22 PROCEDURE — 84484 ASSAY OF TROPONIN QUANT: CPT

## 2025-04-22 PROCEDURE — 80048 BASIC METABOLIC PNL TOTAL CA: CPT

## 2025-04-22 PROCEDURE — 71260 CT THORAX DX C+: CPT

## 2025-04-22 PROCEDURE — 6370000000 HC RX 637 (ALT 250 FOR IP): Performed by: STUDENT IN AN ORGANIZED HEALTH CARE EDUCATION/TRAINING PROGRAM

## 2025-04-22 PROCEDURE — 85025 COMPLETE CBC W/AUTO DIFF WBC: CPT

## 2025-04-22 PROCEDURE — 2500000003 HC RX 250 WO HCPCS: Performed by: STUDENT IN AN ORGANIZED HEALTH CARE EDUCATION/TRAINING PROGRAM

## 2025-04-22 PROCEDURE — 6360000004 HC RX CONTRAST MEDICATION: Performed by: STUDENT IN AN ORGANIZED HEALTH CARE EDUCATION/TRAINING PROGRAM

## 2025-04-22 PROCEDURE — 93005 ELECTROCARDIOGRAM TRACING: CPT | Performed by: STUDENT IN AN ORGANIZED HEALTH CARE EDUCATION/TRAINING PROGRAM

## 2025-04-22 PROCEDURE — 94640 AIRWAY INHALATION TREATMENT: CPT

## 2025-04-22 PROCEDURE — 99285 EMERGENCY DEPT VISIT HI MDM: CPT

## 2025-04-22 PROCEDURE — 6360000002 HC RX W HCPCS: Performed by: STUDENT IN AN ORGANIZED HEALTH CARE EDUCATION/TRAINING PROGRAM

## 2025-04-22 RX ORDER — CEFUROXIME AXETIL 250 MG/1
500 TABLET ORAL 2 TIMES DAILY
Qty: 28 TABLET | Refills: 0 | Status: SHIPPED | OUTPATIENT
Start: 2025-04-22 | End: 2025-04-29

## 2025-04-22 RX ORDER — IOPAMIDOL 755 MG/ML
75 INJECTION, SOLUTION INTRAVASCULAR
Status: COMPLETED | OUTPATIENT
Start: 2025-04-22 | End: 2025-04-22

## 2025-04-22 RX ORDER — IPRATROPIUM BROMIDE AND ALBUTEROL SULFATE 2.5; .5 MG/3ML; MG/3ML
1 SOLUTION RESPIRATORY (INHALATION) ONCE
Status: COMPLETED | OUTPATIENT
Start: 2025-04-22 | End: 2025-04-22

## 2025-04-22 RX ORDER — CEFUROXIME AXETIL 250 MG/1
500 TABLET ORAL ONCE
Status: COMPLETED | OUTPATIENT
Start: 2025-04-22 | End: 2025-04-22

## 2025-04-22 RX ADMIN — CEFUROXIME AXETIL 500 MG: 250 TABLET, FILM COATED ORAL at 22:12

## 2025-04-22 RX ADMIN — IPRATROPIUM BROMIDE AND ALBUTEROL SULFATE 1 DOSE: 2.5; .5 SOLUTION RESPIRATORY (INHALATION) at 20:48

## 2025-04-22 RX ADMIN — WATER 125 MG: 1 INJECTION INTRAMUSCULAR; INTRAVENOUS; SUBCUTANEOUS at 20:56

## 2025-04-22 RX ADMIN — IOPAMIDOL 75 ML: 755 INJECTION, SOLUTION INTRAVENOUS at 21:34

## 2025-04-22 ASSESSMENT — PAIN DESCRIPTION - PAIN TYPE: TYPE: ACUTE PAIN;CHRONIC PAIN

## 2025-04-22 ASSESSMENT — PAIN SCALES - GENERAL: PAINLEVEL_OUTOF10: 9

## 2025-04-22 ASSESSMENT — PAIN DESCRIPTION - ORIENTATION: ORIENTATION: MID;LEFT

## 2025-04-22 ASSESSMENT — HEART SCORE: ECG: NORMAL

## 2025-04-22 ASSESSMENT — PAIN DESCRIPTION - ONSET: ONSET: ON-GOING

## 2025-04-22 ASSESSMENT — PAIN DESCRIPTION - FREQUENCY: FREQUENCY: CONTINUOUS

## 2025-04-22 ASSESSMENT — PAIN DESCRIPTION - LOCATION: LOCATION: CHEST

## 2025-04-23 NOTE — DISCHARGE INSTRUCTIONS
It is mandatory that you follow up with your primary care provider and pulmonology to ensure resolution/improvement of your symptoms.    Please see your discharge paperwork for information to contact Dr. Mosquera with pulmonology.  Alternatively, please schedule an appointment with a specialists of this field with whom you have an existing relationship.    MANDATORY return to the emergency department within 12-24 hours unless you are better.  If you feel worse or have any other concerns, return sooner.    If you experience any of the red flag signs/symptoms that we discussed, please return to the emergency department or call 911 immediately.    Please take medications as we discussed.

## 2025-04-23 NOTE — ED PROVIDER NOTES
EMERGENCY DEPARTMENT PROVIDER NOTE         PATIENT IDENTIFICATION     Name:   Tika Cotto  MRN:   [id number]  YOB: 2000  Date of Evaluation:   4/22/2025  Provider:   Miguelito Spears DO  PCP:   Rimma Brandt APRN - CNP        CHIEF COMPLAINT       Chest Pain        HISTORY OF PRESENT ILLNESS     Tika Cotto is a(n) 24 y.o. female with past medical history as below including pneumomediastinum secondary to asthma exacerbation  who arrives via private vehicle for about 2 days of substernal chest pain described as a sharp/aching pain that has since radiated to just inferior to her left breast.  She states that cough and deep inspiration make her symptoms worse.  She affirms shortness of breath and nonproductive cough.  She affirms diaphoresis.  The patient currently denies fever, chills, recent illness, headache, rash, abdominal pain, nausea, vomiting, change in bowel movements, and urinary symptoms.  She affirms history of similar symptoms when she was diagnosed with pneumomediastinum and admitted to [facility].  States that she was observed and given supplemental oxygen for about a week before she was discharged home.  States that she felt better when she was discharged home.  The patient denies history of blood clot, use of hormone medications, recent immobilization/surgery, recent cancer treatment.   She states that she used her inhalers and nebulizer treatments without significant improvement of symptoms.    I personally reviewed the following nurse documentation:  History reviewed. No pertinent past medical history.  Prior to Admission medications    Medication Sig Start Date End Date Taking? Authorizing Provider   cefUROXime (CEFTIN) 250 MG tablet Take 2 tablets by mouth 2 times daily for 7 days 4/22/25 4/29/25 Yes Miguelito Spears DO   ibuprofen (ADVIL;MOTRIN) 600 MG tablet Take 1 tablet by mouth 4 times daily as needed for Pain 2/18/25   Malia Vela DO   acetaminophen

## 2025-04-23 NOTE — ED TRIAGE NOTES
Pt to ED c/o chest pain for the past couple months that has been worsening the past 2 days. Pt seen at  previously for this. Pt awake and alert.

## 2025-04-25 LAB
EKG ATRIAL RATE: 63 BPM
EKG DIAGNOSIS: NORMAL
EKG P AXIS: 22 DEGREES
EKG P-R INTERVAL: 112 MS
EKG Q-T INTERVAL: 398 MS
EKG QRS DURATION: 74 MS
EKG QTC CALCULATION (BAZETT): 407 MS
EKG R AXIS: 37 DEGREES
EKG T AXIS: 33 DEGREES
EKG VENTRICULAR RATE: 63 BPM

## 2025-04-25 PROCEDURE — 93010 ELECTROCARDIOGRAM REPORT: CPT | Performed by: INTERNAL MEDICINE

## 2025-05-21 ENCOUNTER — HOSPITAL ENCOUNTER (EMERGENCY)
Age: 25
Discharge: HOME | End: 2025-05-21
Attending: EMERGENCY MEDICINE
Payer: COMMERCIAL

## 2025-05-21 VITALS
WEIGHT: 231.48 LBS | HEIGHT: 62 IN | HEART RATE: 108 BPM | TEMPERATURE: 98.2 F | DIASTOLIC BLOOD PRESSURE: 79 MMHG | BODY MASS INDEX: 42.6 KG/M2 | SYSTOLIC BLOOD PRESSURE: 121 MMHG | RESPIRATION RATE: 18 BRPM | OXYGEN SATURATION: 98 %

## 2025-05-21 DIAGNOSIS — K62.5 RECTAL BLEEDING: Primary | ICD-10-CM

## 2025-05-21 LAB
ALBUMIN SERPL-MCNC: 4.5 G/DL (ref 3.4–5)
ALBUMIN/GLOB SERPL: 1.6 {RATIO} (ref 1.1–2.2)
ALP SERPL-CCNC: 86 U/L (ref 40–129)
ALT SERPL-CCNC: 29 U/L (ref 10–40)
ANION GAP SERPL CALCULATED.3IONS-SCNC: 15 MMOL/L (ref 3–16)
AST SERPL-CCNC: 29 U/L (ref 15–37)
BASOPHILS # BLD: 0.2 K/UL (ref 0–0.2)
BASOPHILS NFR BLD: 2.5 %
BILIRUB SERPL-MCNC: 0.4 MG/DL (ref 0–1)
BUN SERPL-MCNC: 9 MG/DL (ref 7–20)
CALCIUM SERPL-MCNC: 9.6 MG/DL (ref 8.3–10.6)
CHLORIDE SERPL-SCNC: 106 MMOL/L (ref 99–110)
CO2 SERPL-SCNC: 22 MMOL/L (ref 21–32)
CREAT SERPL-MCNC: 0.8 MG/DL (ref 0.6–1.1)
DEPRECATED RDW RBC AUTO: 15.6 % (ref 12.4–15.4)
EOSINOPHIL # BLD: 0.2 K/UL (ref 0–0.6)
EOSINOPHIL NFR BLD: 2.1 %
GFR SERPLBLD CREATININE-BSD FMLA CKD-EPI: >90 ML/MIN/{1.73_M2}
GLUCOSE SERPL-MCNC: 106 MG/DL (ref 70–99)
HCG SERPL QL: NEGATIVE
HCT VFR BLD AUTO: 42.7 % (ref 36–48)
HEMOCCULT SP1 STL QL: NORMAL
HGB BLD-MCNC: 14.6 G/DL (ref 12–16)
INR PPP: 0.86 (ref 0.85–1.15)
LYMPHOCYTES # BLD: 2.9 K/UL (ref 1–5.1)
LYMPHOCYTES NFR BLD: 29.1 %
MCH RBC QN AUTO: 34.1 PG (ref 26–34)
MCHC RBC AUTO-ENTMCNC: 34.2 G/DL (ref 31–36)
MCV RBC AUTO: 99.7 FL (ref 80–100)
MONOCYTES # BLD: 0.4 K/UL (ref 0–1.3)
MONOCYTES NFR BLD: 4.3 %
NEUTROPHILS # BLD: 6.1 K/UL (ref 1.7–7.7)
NEUTROPHILS NFR BLD: 62 %
PLATELET # BLD AUTO: 355 K/UL (ref 135–450)
PMV BLD AUTO: 7.5 FL (ref 5–10.5)
POTASSIUM SERPL-SCNC: 3.9 MMOL/L (ref 3.5–5.1)
PROT SERPL-MCNC: 7.4 G/DL (ref 6.4–8.2)
PROTHROMBIN TIME: 11.9 SEC (ref 11.9–14.9)
RBC # BLD AUTO: 4.29 M/UL (ref 4–5.2)
SODIUM SERPL-SCNC: 143 MMOL/L (ref 136–145)
WBC # BLD AUTO: 9.8 K/UL (ref 4–11)

## 2025-05-21 PROCEDURE — 82270 OCCULT BLOOD FECES: CPT

## 2025-05-21 PROCEDURE — 85610 PROTHROMBIN TIME: CPT

## 2025-05-21 PROCEDURE — 80053 COMPREHEN METABOLIC PANEL: CPT

## 2025-05-21 PROCEDURE — 85025 COMPLETE CBC W/AUTO DIFF WBC: CPT

## 2025-05-21 PROCEDURE — 99283 EMERGENCY DEPT VISIT LOW MDM: CPT

## 2025-05-21 PROCEDURE — 84703 CHORIONIC GONADOTROPIN ASSAY: CPT

## 2025-05-21 ASSESSMENT — PAIN - FUNCTIONAL ASSESSMENT: PAIN_FUNCTIONAL_ASSESSMENT: 0-10

## 2025-05-21 ASSESSMENT — PAIN SCALES - GENERAL: PAINLEVEL_OUTOF10: 4

## 2025-05-21 ASSESSMENT — PAIN DESCRIPTION - LOCATION: LOCATION: ABDOMEN

## 2025-05-21 ASSESSMENT — PAIN DESCRIPTION - DESCRIPTORS: DESCRIPTORS: CRAMPING

## 2025-05-21 ASSESSMENT — PAIN DESCRIPTION - ORIENTATION: ORIENTATION: LOWER

## 2025-05-22 NOTE — ED PROVIDER NOTES
and MRI are read by the radiologist. Plain radiographic images are visualized by myself.      *    Interpretation per the Radiologist below, if available at the time of this note:    No orders to display         PROCEDURES   Unless otherwise noted below, none     Procedures    *    CRITICAL CARE TIME   N/A      EMERGENCY DEPARTMENT COURSE and DIFFERENTIALDIAGNOSIS/MDM:   Vitals:    Vitals:    05/21/25 2212   BP: 121/79   Pulse: (!) 108   Resp: 18   Temp: 98.2 °F (36.8 °C)   TempSrc: Oral   SpO2: 98%   Weight: 105 kg (231 lb 7.7 oz)   Height: 1.575 m (5' 2\")       Patient was given thefollowing medications:  Medications - No data to display        The patient tolerated their visit well.   The patient and / or the familywere informed of the results of any tests, a time was given to answer questions.    FINAL IMPRESSION      1. Rectal bleeding          DISPOSITION/PLAN   DISPOSITION Decision To Discharge 05/21/2025 10:48:33 PM   DISPOSITION CONDITION Stable    stool analysis shows no blood.  Her hematocrit is actually higher than last time she was here plan will be for discharge and have patient follow-up with [facility]        PATIENT REFERRED TO:  [facility] [place]  [address] [facility]  [address]  [facility] [address]  [phone]    In 2 days        DISCHARGE MEDICATIONS:  New Prescriptions    No medications on file       DISCONTINUED MEDICATIONS:  Discontinued Medications    ACETAMINOPHEN (AMINOFEN) 325 MG TABLET    Take 2 tablets by mouth every 6 hours as needed for Pain    IBUPROFEN (ADVIL;MOTRIN) 600 MG TABLET    Take 1 tablet by mouth 4 times daily as needed for Pain    LAMOTRIGINE (LAMICTAL) 25 MG TABLET        ONDANSETRON (ZOFRAN-ODT) 4 MG DISINTEGRATING TABLET    Take 0.5 tablets by mouth every 12 hours as needed for Nausea or Vomiting    ONDANSETRON (ZOFRAN-ODT) 4 MG DISINTEGRATING TABLET    Take 1 tablet by mouth 3 times daily as needed for Nausea or Vomiting              (Please note that portions of this note were